# Patient Record
Sex: MALE | Race: BLACK OR AFRICAN AMERICAN | Employment: FULL TIME | URBAN - METROPOLITAN AREA
[De-identification: names, ages, dates, MRNs, and addresses within clinical notes are randomized per-mention and may not be internally consistent; named-entity substitution may affect disease eponyms.]

---

## 2017-01-07 ENCOUNTER — HOSPITAL ENCOUNTER (EMERGENCY)
Age: 27
Discharge: HOME | End: 2017-01-07
Attending: EMERGENCY MEDICINE
Payer: SELF-PAY

## 2017-01-07 VITALS
HEIGHT: 69 IN | SYSTOLIC BLOOD PRESSURE: 162 MMHG | WEIGHT: 280 LBS | TEMPERATURE: 97.8 F | RESPIRATION RATE: 16 BRPM | BODY MASS INDEX: 41.47 KG/M2 | OXYGEN SATURATION: 99 % | DIASTOLIC BLOOD PRESSURE: 94 MMHG | HEART RATE: 70 BPM
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
## 2017-01-07 DIAGNOSIS — T14.8XXA TRAUMATIC MYALGIA: ICD-10-CM

## 2017-01-07 PROCEDURE — 74011250637 HC RX REV CODE- 250/637: Performed by: EMERGENCY MEDICINE

## 2017-01-07 PROCEDURE — 99284 EMERGENCY DEPT VISIT MOD MDM: CPT | Performed by: EMERGENCY MEDICINE

## 2017-01-07 RX ORDER — IBUPROFEN 800 MG/1
800 TABLET ORAL
Status: COMPLETED | OUTPATIENT
Start: 2017-01-07 | End: 2017-01-07

## 2017-01-07 RX ORDER — NAPROXEN SODIUM 550 MG/1
550 TABLET ORAL
Qty: 14 TAB | Refills: 0 | Status: SHIPPED | OUTPATIENT
Start: 2017-01-07 | End: 2017-04-24

## 2017-01-07 RX ORDER — OXYCODONE AND ACETAMINOPHEN 5; 325 MG/1; MG/1
1 TABLET ORAL
Status: COMPLETED | OUTPATIENT
Start: 2017-01-07 | End: 2017-01-07

## 2017-01-07 RX ORDER — CYCLOBENZAPRINE HCL 5 MG
5 TABLET ORAL
Qty: 15 TAB | Refills: 0 | Status: SHIPPED | OUTPATIENT
Start: 2017-01-07 | End: 2017-04-24

## 2017-01-07 RX ADMIN — IBUPROFEN 800 MG: 800 TABLET, FILM COATED ORAL at 19:19

## 2017-01-07 RX ADMIN — OXYCODONE HYDROCHLORIDE AND ACETAMINOPHEN 1 TABLET: 5; 325 TABLET ORAL at 19:19
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
## 2017-01-08 NOTE — ED NOTES
I have reviewed discharge instructions with the patient. The patient verbalized understanding. Pt ambulatory to car. Pt home with family.
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
## 2017-04-24 ENCOUNTER — APPOINTMENT (OUTPATIENT)
Dept: GENERAL RADIOLOGY | Age: 27
End: 2017-04-24
Attending: EMERGENCY MEDICINE
Payer: SELF-PAY

## 2017-04-24 ENCOUNTER — HOSPITAL ENCOUNTER (EMERGENCY)
Age: 27
Discharge: HOME | End: 2017-04-24
Attending: EMERGENCY MEDICINE
Payer: SELF-PAY

## 2017-04-24 VITALS
SYSTOLIC BLOOD PRESSURE: 142 MMHG | TEMPERATURE: 97.9 F | DIASTOLIC BLOOD PRESSURE: 79 MMHG | BODY MASS INDEX: 42.95 KG/M2 | RESPIRATION RATE: 18 BRPM | WEIGHT: 290 LBS | HEIGHT: 69 IN | HEART RATE: 79 BPM | OXYGEN SATURATION: 98 %

## 2017-04-24 DIAGNOSIS — M25.561 ACUTE PAIN OF RIGHT KNEE: Primary | ICD-10-CM

## 2017-04-24 PROCEDURE — 99283 EMERGENCY DEPT VISIT LOW MDM: CPT | Performed by: EMERGENCY MEDICINE

## 2017-04-24 PROCEDURE — 74011250637 HC RX REV CODE- 250/637: Performed by: EMERGENCY MEDICINE

## 2017-04-24 PROCEDURE — 73562 X-RAY EXAM OF KNEE 3: CPT

## 2017-04-24 RX ORDER — NAPROXEN SODIUM 550 MG/1
550 TABLET ORAL
Qty: 12 TAB | Refills: 0 | Status: SHIPPED | OUTPATIENT
Start: 2017-04-24 | End: 2017-10-30

## 2017-04-24 RX ORDER — HYDROCODONE BITARTRATE AND ACETAMINOPHEN 5; 325 MG/1; MG/1
1 TABLET ORAL ONCE
Status: COMPLETED | OUTPATIENT
Start: 2017-04-24 | End: 2017-04-24

## 2017-04-24 RX ORDER — HYDROCODONE BITARTRATE AND ACETAMINOPHEN 5; 325 MG/1; MG/1
1 TABLET ORAL
Qty: 9 TAB | Refills: 0 | Status: SHIPPED | OUTPATIENT
Start: 2017-04-24 | End: 2017-10-30

## 2017-04-24 RX ORDER — IBUPROFEN 800 MG/1
800 TABLET ORAL
Status: COMPLETED | OUTPATIENT
Start: 2017-04-24 | End: 2017-04-24

## 2017-04-24 RX ADMIN — IBUPROFEN 800 MG: 800 TABLET ORAL at 22:44

## 2017-04-24 RX ADMIN — HYDROCODONE BITARTRATE AND ACETAMINOPHEN 1 TABLET: 5; 325 TABLET ORAL at 22:43

## 2017-04-24 NOTE — LETTER
[facility] EMERGENCY DEPT 
[address] [place] [address] [id number] [phone] Work/School Note Date: 4/24/2017 To Whom It May concern: Luis Felipe Lemos was seen and treated today in the emergency room by the following provider(s): 
Attending Provider: Dinorah Munoz DO. Luis Felipe Lemos may return to work on 4-27-17. Sincerely, Lalo Zuniga RN

## 2017-04-25 NOTE — ED NOTES
I have reviewed discharge instructions with the patient. The patient verbalized understanding. Pt ambulated to lobby unassisted and is accompanied by his family.

## 2017-04-25 NOTE — DISCHARGE INSTRUCTIONS
Knee Pain or Injury: Care Instructions  Your Care Instructions    Injuries are a common cause of knee problems. Sudden (acute) injuries may be caused by a direct blow to the knee. They can also be caused by abnormal twisting, bending, or falling on the knee. Pain, bruising, or swelling may be severe, and may start within minutes of the injury. Overuse is another cause of knee pain. Other causes are climbing stairs, kneeling, and other activities that use the knee. Everyday wear and tear, especially as you get older, also can cause knee pain. Rest, along with home treatment, often relieves pain and allows your knee to heal. If you have a serious knee injury, you may need tests and treatment. Follow-up care is a key part of your treatment and safety. Be sure to make and go to all appointments, and call your doctor if you are having problems. It's also a good idea to know your test results and keep a list of the medicines you take. How can you care for yourself at home? · Be safe with medicines. Read and follow all instructions on the label. ¨ If the doctor gave you a prescription medicine for pain, take it as prescribed. ¨ If you are not taking a prescription pain medicine, ask your doctor if you can take an over-the-counter medicine. · Rest and protect your knee. Take a break from any activity that may cause pain. · Put ice or a cold pack on your knee for 10 to 20 minutes at a time. Put a thin cloth between the ice and your skin. · Prop up a sore knee on a pillow when you ice it or anytime you sit or lie down for the next 3 days. Try to keep it above the level of your heart. This will help reduce swelling. · If your knee is not swollen, you can put moist heat, a heating pad, or a warm cloth on your knee. · If your doctor recommends an elastic bandage, sleeve, or other type of support for your knee, wear it as directed.   · Follow your doctor's instructions about how much weight you can put on your leg. Use a cane, crutches, or a walker as instructed. · Follow your doctor's instructions about activity during your healing process. If you can do mild exercise, slowly increase your activity. · Reach and stay at a healthy weight. Extra weight can strain the joints, especially the knees and hips, and make the pain worse. Losing even a few pounds may help. When should you call for help? Call 911 anytime you think you may need emergency care. For example, call if:  · You have symptoms of a blood clot in your lung (called a pulmonary embolism). These may include:  ¨ Sudden chest pain. ¨ Trouble breathing. ¨ Coughing up blood. Call your doctor now or seek immediate medical care if:  · You have severe or increasing pain. · Your leg or foot turns cold or changes color. · You cannot stand or put weight on your knee. · Your knee looks twisted or bent out of shape. · You cannot move your knee. · You have signs of infection, such as:  ¨ Increased pain, swelling, warmth, or redness. ¨ Red streaks leading from the knee. ¨ Pus draining from a place on your knee. ¨ A fever. · You have signs of a blood clot in your leg (called a deep vein thrombosis), such as:  ¨ Pain in your calf, back of the knee, thigh, or groin. ¨ Redness and swelling in your leg or groin. Watch closely for changes in your health, and be sure to contact your doctor if:  · You have tingling, weakness, or numbness in your knee. · You have any new symptoms, such as swelling. · You have bruises from a knee injury that last longer than 2 weeks. · You do not get better as expected. Where can you learn more? Go to http://jana-chelo.info/. Enter K195 in the search box to learn more about \"Knee Pain or Injury: Care Instructions. \"  Current as of: May 27, 2016  Content Version: 11.2  © 3584-7814 [employer].  Care instructions adapted under license by [employer] (which disclaims liability or warranty for this information). If you have questions about a medical condition or this instruction, always ask your healthcare professional. [place] [address] any warranty or liability for your use of this information.
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
## 2017-10-30 ENCOUNTER — HOSPITAL ENCOUNTER (EMERGENCY)
Age: 27
Discharge: HOME | End: 2017-10-30
Attending: EMERGENCY MEDICINE
Payer: SELF-PAY

## 2017-10-30 VITALS
DIASTOLIC BLOOD PRESSURE: 90 MMHG | SYSTOLIC BLOOD PRESSURE: 146 MMHG | WEIGHT: 280 LBS | HEIGHT: 72 IN | OXYGEN SATURATION: 98 % | BODY MASS INDEX: 37.93 KG/M2 | TEMPERATURE: 98.2 F | RESPIRATION RATE: 16 BRPM | HEART RATE: 64 BPM

## 2017-10-30 DIAGNOSIS — J21.9 ACUTE BRONCHIOLITIS DUE TO UNSPECIFIED ORGANISM: Primary | ICD-10-CM

## 2017-10-30 PROCEDURE — 99283 EMERGENCY DEPT VISIT LOW MDM: CPT | Performed by: EMERGENCY MEDICINE

## 2017-10-30 RX ORDER — FLUTICASONE PROPIONATE 50 MCG
2 SPRAY, SUSPENSION (ML) NASAL DAILY
Qty: 1 BOTTLE | Refills: 0 | Status: SHIPPED | OUTPATIENT
Start: 2017-10-30 | End: 2017-11-09

## 2017-10-30 RX ORDER — AZITHROMYCIN 500 MG/1
500 TABLET, FILM COATED ORAL DAILY
Qty: 3 TAB | Refills: 0 | Status: SHIPPED | OUTPATIENT
Start: 2017-10-30 | End: 2017-11-09

## 2017-10-30 RX ORDER — BENZONATATE 100 MG/1
100 CAPSULE ORAL
Qty: 30 CAP | Refills: 0 | Status: SHIPPED | OUTPATIENT
Start: 2017-10-30 | End: 2017-11-06

## 2017-10-30 NOTE — DISCHARGE INSTRUCTIONS
Bronchitis: Care Instructions  Your Care Instructions    Bronchitis is inflammation of the bronchial tubes, which carry air to the lungs. The tubes swell and produce mucus, or phlegm. The mucus and inflamed bronchial tubes make you cough. You may have trouble breathing. Most cases of bronchitis are caused by viruses like those that cause colds. Antibiotics usually do not help and they may be harmful. Bronchitis usually develops rapidly and lasts about 2 to 3 weeks in otherwise healthy people. Follow-up care is a key part of your treatment and safety. Be sure to make and go to all appointments, and call your doctor if you are having problems. It's also a good idea to know your test results and keep a list of the medicines you take. How can you care for yourself at home? · Take all medicines exactly as prescribed. Call your doctor if you think you are having a problem with your medicine. · Get some extra rest.  · Take an over-the-counter pain medicine, such as acetaminophen (Tylenol), ibuprofen (Advil, Motrin), or naproxen (Aleve) to reduce fever and relieve body aches. Read and follow all instructions on the label. · Do not take two or more pain medicines at the same time unless the doctor told you to. Many pain medicines have acetaminophen, which is Tylenol. Too much acetaminophen (Tylenol) can be harmful. · Take an over-the-counter cough medicine that contains dextromethorphan to help quiet a dry, hacking cough so that you can sleep. Avoid cough medicines that have more than one active ingredient. Read and follow all instructions on the label. · Breathe moist air from a humidifier, hot shower, or sink filled with hot water. The heat and moisture will thin mucus so you can cough it out. · Do not smoke. Smoking can make bronchitis worse. If you need help quitting, talk to your doctor about stop-smoking programs and medicines. These can increase your chances of quitting for good.   When should you call for help? Call 911 anytime you think you may need emergency care. For example, call if:  ? · You have severe trouble breathing. ?Call your doctor now or seek immediate medical care if:  ? · You have new or worse trouble breathing. ? · You cough up dark brown or bloody mucus (sputum). ? · You have a new or higher fever. ? · You have a new rash. ? Watch closely for changes in your health, and be sure to contact your doctor if:  ? · You cough more deeply or more often, especially if you notice more mucus or a change in the color of your mucus. ? · You are not getting better as expected. Where can you learn more? Go to http://jana-chelo.info/. Enter H333 in the search box to learn more about \"Bronchitis: Care Instructions. \"  Current as of: May 12, 2017  Content Version: 11.4  © 4775-5863 [employer]. Care instructions adapted under license by [employer] (which disclaims liability or warranty for this information). If you have questions about a medical condition or this instruction, always ask your healthcare professional. [address] any warranty or liability for your use of this information.

## 2017-10-30 NOTE — LETTER
[facility] EMERGENCY DEPT 
[address] [facility] [place] [id number] 
[phone] Work/School Note Date: 10/30/2017 To Whom It May concern: Christina Mcfarland was seen and treated today in the emergency room by the following provider(s): 
Attending Provider: Corrina Arevalo MD 
Nurse Practitioner: MOHIT Hanna. Christina Mcfarland was see in the Emergency Department 10/30/2017.  
 
Sincerely, 
 
 
 
 
MOHIT Hanna

## 2017-10-30 NOTE — ED TRIAGE NOTES
Pt arrives via personal vehicle for complaint of cough going on for a month. Pt denies coughing anything up. Pt is not a smoker, pt denies fever, aches or chills.  Pt's only complaint is a dry a cough

## 2017-10-30 NOTE — ED PROVIDER NOTES
HPI Comments: Patient presents with cough for the past month. He denies shortness of breath, fever, and headache. Patient is a 24 y.o. male presenting with cough. The history is provided by the patient. Cough   This is a new problem. The current episode started more than 1 week ago. The problem occurs constantly. The problem has not changed since onset. The cough is non-productive. There has been no fever. Pertinent negatives include no chest pain, no chills, no sweats, no eye redness, no ear congestion, no ear pain, no headaches, no rhinorrhea, no sore throat, no myalgias, no shortness of breath, no nausea, no vomiting and no confusion. He has tried nothing for the symptoms. The treatment provided no relief. He is not a smoker. History reviewed. No pertinent past medical history. History reviewed. No pertinent surgical history. History reviewed. No pertinent family history. Social History     Social History    Marital status: SINGLE     Spouse name: N/A    Number of children: N/A    Years of education: N/A     Occupational History    Not on file. Social History Main Topics    Smoking status: Never Smoker    Smokeless tobacco: Not on file    Alcohol use No    Drug use: No    Sexual activity: Not on file     Other Topics Concern    Not on file     Social History Narrative         ALLERGIES: Review of patient's allergies indicates no known allergies. Review of Systems   Constitutional: Negative for chills and fever. HENT: Negative for congestion, ear pain, rhinorrhea and sore throat. Eyes: Negative for redness. Respiratory: Positive for cough. Negative for shortness of breath. Cardiovascular: Negative for chest pain. Gastrointestinal: Negative for abdominal pain, blood in stool, constipation, nausea and vomiting. Musculoskeletal: Negative for arthralgias and myalgias. Neurological: Negative for dizziness, syncope, weakness and headaches.    Psychiatric/Behavioral: Negative for confusion. Vitals:    10/30/17 1112   BP: (!) 148/94   Pulse: 60   Resp: 20   Temp: 98.1 °F (36.7 °C)   SpO2: 98%   Weight: 127 kg (280 lb)   Height: 6' (1.829 m)            Physical Exam   Constitutional: He is oriented to person, place, and time. He appears well-developed and well-nourished. No distress. HENT:   Right Ear: Tympanic membrane is erythematous. Tympanic membrane is not injected. A middle ear effusion is present. Left Ear: Tympanic membrane is erythematous. Tympanic membrane is not injected. A middle ear effusion is present. Nose: Mucosal edema present. Right sinus exhibits no maxillary sinus tenderness and no frontal sinus tenderness. Left sinus exhibits no maxillary sinus tenderness and no frontal sinus tenderness. Mouth/Throat: Uvula is midline and mucous membranes are normal. Posterior oropharyngeal erythema present. Cardiovascular: Normal rate and regular rhythm. No murmur heard. Pulmonary/Chest: Effort normal and breath sounds normal. No respiratory distress. He has no wheezes. Abdominal: Soft. Bowel sounds are normal. He exhibits no distension. There is no tenderness. Musculoskeletal: Normal range of motion. Neurological: He is alert and oriented to person, place, and time. Skin: Skin is warm and dry. He is not diaphoretic. Psychiatric: He has a normal mood and affect. His behavior is normal.   Nursing note and vitals reviewed. MDM  Number of Diagnoses or Management Options  Acute bronchiolitis due to unspecified organism:   Diagnosis management comments: Patient given prescriptions for tessalon, azithromycin, zyrtec and flonase.      Patient Progress  Patient progress: stable    ED Course       Procedures

## 2017-10-30 NOTE — ED NOTES
I have reviewed discharge instructions with the patient. The patient verbalized understanding. Patient left ED via Discharge Method: ambulatory to Home with (insert name of family/friend, self, transport via self). Opportunity for questions and clarification provided. Patient given 4 scripts.

## 2017-11-09 ENCOUNTER — HOSPITAL ENCOUNTER (EMERGENCY)
Age: 27
Discharge: HOME | End: 2017-11-09
Attending: EMERGENCY MEDICINE
Payer: SELF-PAY

## 2017-11-09 VITALS
BODY MASS INDEX: 37.93 KG/M2 | WEIGHT: 280 LBS | TEMPERATURE: 98.4 F | RESPIRATION RATE: 17 BRPM | HEIGHT: 72 IN | SYSTOLIC BLOOD PRESSURE: 175 MMHG | DIASTOLIC BLOOD PRESSURE: 74 MMHG | HEART RATE: 74 BPM | OXYGEN SATURATION: 97 %

## 2017-11-09 DIAGNOSIS — M67.431 GANGLION CYST OF WRIST, RIGHT: Primary | ICD-10-CM

## 2017-11-09 PROCEDURE — 99282 EMERGENCY DEPT VISIT SF MDM: CPT | Performed by: EMERGENCY MEDICINE

## 2017-11-09 RX ORDER — NAPROXEN SODIUM 550 MG/1
550 TABLET ORAL
Qty: 20 TAB | Refills: 0 | Status: SHIPPED | OUTPATIENT
Start: 2017-11-09 | End: 2018-08-08

## 2017-11-09 NOTE — ED PROVIDER NOTES
HPI Comments: Patient presents to the ER complaining of a \"cyst\" on his right wrist.  States this has been there for a couple of months. Does report some intermittent pain at times. Denies any fevers. Reports he did hit his right wrist on something yesterday and had some increase of pain. Denies any other trauma    Patient is a 24 y.o. male presenting with cyst. The history is provided by the patient. Cyst    This is a chronic problem. The current episode started more than 1 week ago. The problem has not changed since onset. The rash is present on the right wrist. The pain is at a severity of 1/10. The patient is experiencing no pain. Associated symptoms include pain. Pertinent negatives include no itching. History reviewed. No pertinent past medical history. History reviewed. No pertinent surgical history. History reviewed. No pertinent family history. Social History     Social History    Marital status: SINGLE     Spouse name: N/A    Number of children: N/A    Years of education: N/A     Occupational History    Not on file. Social History Main Topics    Smoking status: Never Smoker    Smokeless tobacco: Never Used    Alcohol use No    Drug use: No    Sexual activity: Not on file     Other Topics Concern    Not on file     Social History Narrative         ALLERGIES: Review of patient's allergies indicates no known allergies. Review of Systems   Constitutional: Negative for fever and unexpected weight change. HENT: Negative for congestion and dental problem. Eyes: Negative for photophobia, redness and visual disturbance. Respiratory: Negative for choking, shortness of breath and stridor. Cardiovascular: Negative for palpitations and leg swelling. Gastrointestinal: Negative for abdominal pain and anal bleeding. Endocrine: Negative for polydipsia and polyphagia. Genitourinary: Negative for decreased urine volume, flank pain, hematuria and urgency. Musculoskeletal: Negative for back pain and gait problem. Skin: Negative for itching. Allergic/Immunologic: Negative for food allergies and immunocompromised state. Neurological: Negative for light-headedness and numbness. Hematological: Negative for adenopathy. Does not bruise/bleed easily. Psychiatric/Behavioral: Negative for behavioral problems. All other systems reviewed and are negative. Vitals:    11/09/17 1323   Pulse: 74   Resp: 17   Temp: 98.4 °F (36.9 °C)   SpO2: 97%   Weight: 127 kg (280 lb)   Height: 6' (1.829 m)            Physical Exam   Constitutional: He is oriented to person, place, and time. He appears well-developed and well-nourished. Cardiovascular: Normal rate and regular rhythm. Pulmonary/Chest: Effort normal and breath sounds normal.   Musculoskeletal:        Left wrist: He exhibits no tenderness and no bony tenderness. Arms:  Neurological: He is alert and oriented to person, place, and time. He has normal reflexes. Nursing note and vitals reviewed.        MDM  Number of Diagnoses or Management Options  Diagnosis management comments: Exam is consistent with a ganglion cyst of right wrist  No signs of infection  Discuss further supportive care    Risk of Complications, Morbidity, and/or Mortality  Presenting problems: low  Diagnostic procedures: low  Management options: low      ED Course       Procedures

## 2017-11-09 NOTE — LETTER
[address] [facility] EMERGENCY DEPT 
[facility] [address] [place] [id number] 
[phone] Work/School Note Date: 11/9/2017 To Whom It May concern: Alexandra Garduno was seen and treated today in the emergency room by the following provider(s): 
Attending Provider: Maria Luz Engel MD. Alexandra Garduno may return to work on 11/10/17. Sincerely, Karine Hernandez RN

## 2017-11-09 NOTE — ED TRIAGE NOTES
Pt in c/o right wrist pain \"for a while\". Cyst noted to right wrist. Denies pain. States it was painful yesterday after he hit it on something.

## 2017-11-09 NOTE — DISCHARGE INSTRUCTIONS
Ganglions: Care Instructions  Your Care Instructions    A ganglion is a small sac, or cyst, filled with a clear fluid that is like jelly. A ganglion may look like a bump on the hand or wrist. It also can appear on your feet, ankles, knees, or shoulders. It is not cancer. A ganglion can grow out of the protective area, or capsule, around a joint. It also can grow on a tendon sheath, which covers the ropelike tendons that connect muscle to bone. A ganglion may hurt or cause numbness if it presses on a nerve. Many ganglions do not need treatment, and they often go away on their own. But if a ganglion hurts, becomes larger, causes numbness, or limits your activity, your doctor may want to drain it with a needle and syringe or remove it with minor surgery. Follow-up care is a key part of your treatment and safety. Be sure to make and go to all appointments, and call your doctor if you are having problems. It's also a good idea to know your test results and keep a list of the medicines you take. How can you care for yourself at home? · Wear a wrist or finger splint as directed by your doctor. It will keep your wrist or hand from moving and help reduce the fluid in the cyst. This may be all you need for the ganglion to shrink and go away. · Do not smash a ganglion with a book or other heavy object. You may break a bone or otherwise injure your wrist by trying this folk remedy, and the ganglion may return anyway. · Do not try to drain the fluid by poking the ganglion with a pin or any other sharp object. You could cause an infection. When should you call for help? Call your doctor now or seek immediate medical care if:  ? · You have signs of infection, such as:  ¨ Increased pain, swelling, warmth, or redness. ¨ Red streaks leading from the cyst.  ¨ Pus draining from the cyst.  ¨ A fever. ? Watch closely for changes in your health, and be sure to contact your doctor if:  ? · You have increasing pain. ? · Your ganglion is getting larger. ? · You still have pain or numbness from a ganglion. Where can you learn more? Go to http://jana-chelo.info/. Enter M328 in the search box to learn more about \"Ganglions: Care Instructions. \"  Current as of: March 21, 2017  Content Version: 11.4  © 5057-9412 [employer]. Care instructions adapted under license by [employer] (which disclaims liability or warranty for this information). If you have questions about a medical condition or this instruction, always ask your healthcare professional. [place] [address] any warranty or liability for your use of this information.

## 2017-11-23 ENCOUNTER — APPOINTMENT (OUTPATIENT)
Dept: GENERAL RADIOLOGY | Age: 27
End: 2017-11-23
Payer: SELF-PAY

## 2017-11-23 ENCOUNTER — HOSPITAL ENCOUNTER (EMERGENCY)
Age: 27
Discharge: HOME | End: 2017-11-23
Attending: EMERGENCY MEDICINE
Payer: SELF-PAY

## 2017-11-23 VITALS
DIASTOLIC BLOOD PRESSURE: 80 MMHG | BODY MASS INDEX: 37.93 KG/M2 | SYSTOLIC BLOOD PRESSURE: 134 MMHG | WEIGHT: 280 LBS | TEMPERATURE: 98.2 F | HEIGHT: 72 IN | HEART RATE: 80 BPM | RESPIRATION RATE: 16 BRPM | OXYGEN SATURATION: 99 %

## 2017-11-23 DIAGNOSIS — R11.2 NAUSEA AND VOMITING, INTRACTABILITY OF VOMITING NOT SPECIFIED, UNSPECIFIED VOMITING TYPE: Primary | ICD-10-CM

## 2017-11-23 LAB
ALBUMIN SERPL-MCNC: 3.6 G/DL (ref 3.5–5)
ALBUMIN/GLOB SERPL: 1 {RATIO} (ref 1.2–3.5)
ALP SERPL-CCNC: 90 U/L (ref 50–136)
ALT SERPL-CCNC: 67 U/L (ref 12–65)
ANION GAP SERPL CALC-SCNC: 8 MMOL/L (ref 7–16)
AST SERPL-CCNC: 41 U/L (ref 15–37)
BASOPHILS # BLD: 0 K/UL (ref 0–0.2)
BASOPHILS NFR BLD: 0 % (ref 0–2)
BILIRUB SERPL-MCNC: 0.9 MG/DL (ref 0.2–1.1)
BUN SERPL-MCNC: 13 MG/DL (ref 6–23)
CALCIUM SERPL-MCNC: 8.5 MG/DL (ref 8.3–10.4)
CHLORIDE SERPL-SCNC: 105 MMOL/L (ref 98–107)
CO2 SERPL-SCNC: 29 MMOL/L (ref 21–32)
CREAT SERPL-MCNC: 1.13 MG/DL (ref 0.8–1.5)
DIFFERENTIAL METHOD BLD: ABNORMAL
EOSINOPHIL # BLD: 0 K/UL (ref 0–0.8)
EOSINOPHIL NFR BLD: 0 % (ref 0.5–7.8)
ERYTHROCYTE [DISTWIDTH] IN BLOOD BY AUTOMATED COUNT: 17.3 % (ref 11.9–14.6)
GLOBULIN SER CALC-MCNC: 3.6 G/DL (ref 2.3–3.5)
GLUCOSE SERPL-MCNC: 113 MG/DL (ref 65–100)
HCT VFR BLD AUTO: 47.6 % (ref 41.1–50.3)
HGB BLD-MCNC: 15.9 G/DL (ref 13.6–17.2)
LIPASE SERPL-CCNC: 113 U/L (ref 73–393)
LYMPHOCYTES # BLD: 0.4 K/UL (ref 0.5–4.6)
LYMPHOCYTES NFR BLD: 5 % (ref 13–44)
MCH RBC QN AUTO: 27.3 PG (ref 26.1–32.9)
MCHC RBC AUTO-ENTMCNC: 33.4 G/DL (ref 31.4–35)
MCV RBC AUTO: 81.8 FL (ref 79.6–97.8)
MONOCYTES # BLD: 0.4 K/UL (ref 0.1–1.3)
MONOCYTES NFR BLD: 5 % (ref 4–12)
NEUTS SEG # BLD: 6.3 K/UL (ref 1.7–8.2)
NEUTS SEG NFR BLD: 90 % (ref 43–78)
PLATELET # BLD AUTO: 171 K/UL (ref 150–450)
PLATELET COMMENTS,PCOM: SLIGHT
PMV BLD AUTO: ABNORMAL FL (ref 10.8–14.1)
POTASSIUM SERPL-SCNC: 3.6 MMOL/L (ref 3.5–5.1)
PROT SERPL-MCNC: 7.2 G/DL (ref 6.3–8.2)
RBC # BLD AUTO: 5.82 M/UL (ref 4.23–5.67)
RBC MORPH BLD: ABNORMAL
SODIUM SERPL-SCNC: 142 MMOL/L (ref 136–145)
WBC # BLD AUTO: 7.1 K/UL (ref 4.3–11.1)
WBC MORPH BLD: ABNORMAL

## 2017-11-23 PROCEDURE — 80053 COMPREHEN METABOLIC PANEL: CPT | Performed by: PHYSICIAN ASSISTANT

## 2017-11-23 PROCEDURE — 85025 COMPLETE CBC W/AUTO DIFF WBC: CPT | Performed by: PHYSICIAN ASSISTANT

## 2017-11-23 PROCEDURE — 96361 HYDRATE IV INFUSION ADD-ON: CPT | Performed by: PHYSICIAN ASSISTANT

## 2017-11-23 PROCEDURE — 83690 ASSAY OF LIPASE: CPT | Performed by: PHYSICIAN ASSISTANT

## 2017-11-23 PROCEDURE — 74020 XR ABD (AP AND ERECT OR DECUB): CPT

## 2017-11-23 PROCEDURE — 81003 URINALYSIS AUTO W/O SCOPE: CPT | Performed by: PHYSICIAN ASSISTANT

## 2017-11-23 PROCEDURE — 74011250636 HC RX REV CODE- 250/636: Performed by: PHYSICIAN ASSISTANT

## 2017-11-23 PROCEDURE — 96374 THER/PROPH/DIAG INJ IV PUSH: CPT | Performed by: PHYSICIAN ASSISTANT

## 2017-11-23 PROCEDURE — 99284 EMERGENCY DEPT VISIT MOD MDM: CPT | Performed by: PHYSICIAN ASSISTANT

## 2017-11-23 RX ORDER — SODIUM CHLORIDE 0.9 % (FLUSH) 0.9 %
5-10 SYRINGE (ML) INJECTION EVERY 8 HOURS
Status: DISCONTINUED | OUTPATIENT
Start: 2017-11-23 | End: 2017-11-23 | Stop reason: HOSPADM

## 2017-11-23 RX ORDER — SODIUM CHLORIDE 0.9 % (FLUSH) 0.9 %
5-10 SYRINGE (ML) INJECTION AS NEEDED
Status: DISCONTINUED | OUTPATIENT
Start: 2017-11-23 | End: 2017-11-23 | Stop reason: HOSPADM

## 2017-11-23 RX ORDER — ONDANSETRON HYDROCHLORIDE 8 MG/1
8 TABLET, FILM COATED ORAL
Qty: 10 TAB | Refills: 0 | Status: SHIPPED | OUTPATIENT
Start: 2017-11-23 | End: 2018-08-08

## 2017-11-23 RX ORDER — ONDANSETRON 2 MG/ML
4 INJECTION INTRAMUSCULAR; INTRAVENOUS
Status: COMPLETED | OUTPATIENT
Start: 2017-11-23 | End: 2017-11-23

## 2017-11-23 RX ADMIN — ONDANSETRON 4 MG: 2 INJECTION INTRAMUSCULAR; INTRAVENOUS at 14:56

## 2017-11-23 RX ADMIN — SODIUM CHLORIDE 1000 ML: 900 INJECTION, SOLUTION INTRAVENOUS at 14:56

## 2017-11-23 NOTE — ED TRIAGE NOTES
Patient complaining of nausea with vomiting and diarrhea that woke him up around 0400 this morning. Patient complaining of body aches and weakness.

## 2017-11-23 NOTE — LETTER
[facility] EMERGENCY DEPT 
[address] [place] [id number] [phone] Work/School Note Date: 11/23/2017 To Whom It May concern: Roslyn Jessica was seen and treated today in the emergency room by the following provider(s): 
Attending Provider: Gray Tubbs MD 
Physician Assistant: CRIS Davis. Roslyn Jessica may return to work on 11-25-17. Sincerely, CRIS Davis

## 2017-11-23 NOTE — ED NOTES
I have reviewed discharge instructions with the patient. The patient verbalized understanding. Patient left ED via Discharge Method: ambulatory to Home with family. Opportunity for questions and clarification provided. Patient given 1 scripts. To continue your aftercare when you leave the hospital, you may receive an automated call from our care team to check in on how you are doing. This is a free service and part of our promise to provide the best care and service to meet your aftercare needs.  If you have questions, or wish to unsubscribe from this service please call [phone]. Thank you for Choosing our [facility] Emergency Department.

## 2017-11-23 NOTE — DISCHARGE INSTRUCTIONS
Nausea and Vomiting: Care Instructions  Your Care Instructions    When you are nauseated, you may feel weak and sweaty and notice a lot of saliva in your mouth. Nausea often leads to vomiting. Most of the time you do not need to worry about nausea and vomiting, but they can be signs of other illnesses. Two common causes of nausea and vomiting are stomach flu and food poisoning. Nausea and vomiting from viral stomach flu will usually start to improve within 24 hours. Nausea and vomiting from food poisoning may last from 12 to 48 hours. The doctor has checked you carefully, but problems can develop later. If you notice any problems or new symptoms, get medical treatment right away. Follow-up care is a key part of your treatment and safety. Be sure to make and go to all appointments, and call your doctor if you are having problems. It's also a good idea to know your test results and keep a list of the medicines you take. How can you care for yourself at home? · To prevent dehydration, drink plenty of fluids, enough so that your urine is light yellow or clear like water. Choose water and other caffeine-free clear liquids until you feel better. If you have kidney, heart, or liver disease and have to limit fluids, talk with your doctor before you increase the amount of fluids you drink. · Rest in bed until you feel better. · When you are able to eat, try clear soups, mild foods, and liquids until all symptoms are gone for 12 to 48 hours. Other good choices include dry toast, crackers, cooked cereal, and gelatin dessert, such as Jell-O. When should you call for help? Call 911 anytime you think you may need emergency care. For example, call if:  ? · You passed out (lost consciousness). ?Call your doctor now or seek immediate medical care if:  ? · You have symptoms of dehydration, such as:  ¨ Dry eyes and a dry mouth. ¨ Passing only a little dark urine.   ¨ Feeling thirstier than usual.   ? · You have new or worsening belly pain. ? · You have a new or higher fever. ? · You vomit blood or what looks like coffee grounds. ? Watch closely for changes in your health, and be sure to contact your doctor if:  ? · You have ongoing nausea and vomiting. ? · Your vomiting is getting worse. ? · Your vomiting lasts longer than 2 days. ? · You are not getting better as expected. Where can you learn more? Go to http://jana-chelo.info/. Enter 25 536557 in the search box to learn more about \"Nausea and Vomiting: Care Instructions. \"  Current as of: March 20, 2017  Content Version: 11.4  © 7829-6994 Where. Care instructions adapted under license by [employer] (which disclaims liability or warranty for this information). If you have questions about a medical condition or this instruction, always ask your healthcare professional. [address] any warranty or liability for your use of this information.

## 2018-08-08 ENCOUNTER — HOSPITAL ENCOUNTER (EMERGENCY)
Age: 28
Discharge: HOME | End: 2018-08-08
Attending: STUDENT IN AN ORGANIZED HEALTH CARE EDUCATION/TRAINING PROGRAM
Payer: COMMERCIAL

## 2018-08-08 ENCOUNTER — APPOINTMENT (OUTPATIENT)
Dept: GENERAL RADIOLOGY | Age: 28
End: 2018-08-08
Attending: EMERGENCY MEDICINE
Payer: COMMERCIAL

## 2018-08-08 VITALS
HEART RATE: 65 BPM | TEMPERATURE: 98.4 F | HEIGHT: 69 IN | SYSTOLIC BLOOD PRESSURE: 149 MMHG | RESPIRATION RATE: 16 BRPM | WEIGHT: 282 LBS | DIASTOLIC BLOOD PRESSURE: 87 MMHG | BODY MASS INDEX: 41.77 KG/M2 | OXYGEN SATURATION: 98 %

## 2018-08-08 DIAGNOSIS — M67.432 GANGLION CYST OF DORSUM OF LEFT WRIST: Primary | ICD-10-CM

## 2018-08-08 PROCEDURE — 99283 EMERGENCY DEPT VISIT LOW MDM: CPT | Performed by: STUDENT IN AN ORGANIZED HEALTH CARE EDUCATION/TRAINING PROGRAM

## 2018-08-08 PROCEDURE — 73110 X-RAY EXAM OF WRIST: CPT

## 2018-08-08 PROCEDURE — 74011250637 HC RX REV CODE- 250/637: Performed by: STUDENT IN AN ORGANIZED HEALTH CARE EDUCATION/TRAINING PROGRAM

## 2018-08-08 RX ORDER — TRAMADOL HYDROCHLORIDE 50 MG/1
50 TABLET ORAL
Status: COMPLETED | OUTPATIENT
Start: 2018-08-08 | End: 2018-08-08

## 2018-08-08 RX ORDER — IBUPROFEN 800 MG/1
800 TABLET ORAL
Qty: 20 TAB | Refills: 0 | Status: SHIPPED | OUTPATIENT
Start: 2018-08-08 | End: 2018-08-15

## 2018-08-08 RX ADMIN — TRAMADOL HYDROCHLORIDE 50 MG: 50 TABLET, FILM COATED ORAL at 23:49

## 2018-08-08 NOTE — LETTER
[facility] EMERGENCY DEPT 
[address] [facility] [id number] 
[phone] Work/School Note Date: 8/8/2018 To Whom It May concern: Hany Kirby was seen and treated today in the emergency room by the following provider(s): 
Attending Provider: Kanika Leach DO. Hany Kirby may return to work on [employer]. Sincerely, Lindsay Gore RN

## 2018-08-09 NOTE — DISCHARGE INSTRUCTIONS
Ganglions: Care Instructions  Your Care Instructions    A ganglion is a small sac, or cyst, filled with a clear fluid that is like jelly. A ganglion may look like a bump on the hand or wrist. It also can appear on your feet, ankles, knees, or shoulders. It is not cancer. A ganglion can grow out of the protective area, or capsule, around a joint. It also can grow on a tendon sheath, which covers the ropelike tendons that connect muscle to bone. A ganglion may hurt or cause numbness if it presses on a nerve. Many ganglions do not need treatment, and they often go away on their own. But if a ganglion hurts, becomes larger, causes numbness, or limits your activity, your doctor may want to drain it with a needle and syringe or remove it with minor surgery. Follow-up care is a key part of your treatment and safety. Be sure to make and go to all appointments, and call your doctor if you are having problems. It's also a good idea to know your test results and keep a list of the medicines you take. How can you care for yourself at home? · Wear a wrist or finger splint as directed by your doctor. It will keep your wrist or hand from moving and help reduce the fluid in the cyst. This may be all you need for the ganglion to shrink and go away. · Do not smash a ganglion with a book or other heavy object. You may break a bone or otherwise injure your wrist by trying this folk remedy, and the ganglion may return anyway. · Do not try to drain the fluid by poking the ganglion with a pin or any other sharp object. You could cause an infection. When should you call for help? Call your doctor now or seek immediate medical care if:    · You have signs of infection, such as:  ¨ Increased pain, swelling, warmth, or redness.   ¨ Red streaks leading from the cyst.  ¨ Pus draining from the cyst.  ¨ A fever.    Watch closely for changes in your health, and be sure to contact your doctor if:    · You have increasing pain.     · Your ganglion is getting larger.     · You still have pain or numbness from a ganglion. Where can you learn more? Go to http://jana-chelo.info/. Enter J413 in the search box to learn more about \"Ganglions: Care Instructions. \"  Current as of: November 29, 2017  Content Version: 11.7  © 0478-0147 [employer]. Care instructions adapted under license by [employer] (which disclaims liability or warranty for this information). If you have questions about a medical condition or this instruction, always ask your healthcare professional. [place] [address] any warranty or liability for your use of this information.

## 2018-08-09 NOTE — ED TRIAGE NOTES
Bump on underside of right wrist, states \"it's been there a minute\" when asked for clarification pt states present over a year. It just started hurting today.

## 2018-08-09 NOTE — ED PROVIDER NOTES
HPI Comments: 68-year-old male patient presents with reports of right wrist pain secondary to an area of swelling on the lateral aspect of the right wrist.  Patient states his symptoms have been present for at least a year ago worsened over the past several weeks. He denies injury to the area. He states his pain and swelling seems to have increased with exertion at work. He lifts heavy objects at work on a regular basis. Patient denies previous evaluation for these symptoms. He denies any falls or trauma to the area. Denies previous similar symptoms. Patient is a 34 y.o. male presenting with wrist pain. The history is provided by the patient. Wrist Pain    This is a chronic problem. The current episode started more than 1 week ago. The problem occurs constantly. The problem has been gradually worsening. The pain is present in the right wrist. The quality of the pain is described as aching and sharp. The pain is moderate. Pertinent negatives include no numbness, full range of motion, no stiffness, no tingling, no itching, no back pain and no neck pain. The symptoms are aggravated by contact, movement and palpation. There has been no history of extremity trauma. History reviewed. No pertinent past medical history. History reviewed. No pertinent surgical history. History reviewed. No pertinent family history. Social History     Social History    Marital status: SINGLE     Spouse name: N/A    Number of children: N/A    Years of education: N/A     Occupational History    Not on file. Social History Main Topics    Smoking status: Never Smoker    Smokeless tobacco: Never Used    Alcohol use No    Drug use: No    Sexual activity: Not on file     Other Topics Concern    Not on file     Social History Narrative    ** Merged History Encounter **              ALLERGIES: Review of patient's allergies indicates no known allergies.     Review of Systems   Musculoskeletal: Negative for back pain, neck pain and stiffness. Skin: Negative for itching. Neurological: Negative for tingling and numbness. All other systems reviewed and are negative. Vitals:    08/08/18 2151 08/08/18 2351   BP: (!) 164/98 149/87   Pulse: 77 65   Resp: 17 16   Temp: 98.3 °F (36.8 °C) 98.4 °F (36.9 °C)   SpO2: 98% 98%   Weight: 127.9 kg (282 lb)    Height: 5' 9\" (1.753 m)             Physical Exam   Constitutional: He is oriented to person, place, and time. He appears well-developed and well-nourished. No distress. Alert and oriented to person, place and time. No acute distress. Speaks in clear, fluent sentences. HENT:   Head: Normocephalic and atraumatic. Nose: Nose normal.   Eyes: Conjunctivae and EOM are normal. Pupils are equal, round, and reactive to light. Neck: Normal range of motion. Neck supple. No JVD present. No tracheal deviation present. Cardiovascular: S2 normal and intact distal pulses. Pulmonary/Chest: Effort normal and breath sounds normal. No accessory muscle usage or stridor. No tachypnea and no bradypnea. No respiratory distress. He exhibits no tenderness. Abdominal: Normal appearance. Musculoskeletal: Normal range of motion. He exhibits no edema, tenderness or deformity. quarter size area of swelling over the lateral aspects of the patient's risk on the volar surface, right sided. Pulses are intact, brisk capillary refill present. No evidence of other abnormality on evaluation. Neurological: He is alert and oriented to person, place, and time. No cranial nerve deficit. Skin: Skin is warm and dry. No rash noted. He is not diaphoretic. Psychiatric: He has a normal mood and affect. His behavior is normal.   Nursing note and vitals reviewed.        MDM  Number of Diagnoses or Management Options  Ganglion cyst of dorsum of left wrist: new and requires workup  Diagnosis management comments: Extremities unremarkable, physical exam consistent with ganglion cyst.  Patient will be referred to orthopedic specialists for further evaluation and discussion of removal of the cyst.       Amount and/or Complexity of Data Reviewed  Tests in the radiology section of CPT®: ordered and reviewed    Risk of Complications, Morbidity, and/or Mortality  Presenting problems: moderate  Diagnostic procedures: low  Management options: moderate    Patient Progress  Patient progress: stable        ED Course       Procedures

## 2018-08-09 NOTE — ED NOTES
I have reviewed discharge instructions with the patient and parent. The patient and parent verbalized understanding. Patient left ED via Discharge Method: ambulatory to Home with Olya. Opportunity for questions and clarification provided. Patient given 1 scripts. To continue your aftercare when you leave the hospital, you may receive an automated call from our care team to check in on how you are doing. This is a free service and part of our promise to provide the best care and service to meet your aftercare needs.  If you have questions, or wish to unsubscribe from this service please call [phone]. Thank you for Choosing our [facility] Emergency Department.

## 2018-09-01 ENCOUNTER — HOSPITAL ENCOUNTER (EMERGENCY)
Age: 28
Discharge: HOME | End: 2018-09-01
Attending: EMERGENCY MEDICINE
Payer: COMMERCIAL

## 2018-09-01 VITALS
TEMPERATURE: 98.2 F | RESPIRATION RATE: 18 BRPM | SYSTOLIC BLOOD PRESSURE: 154 MMHG | HEART RATE: 58 BPM | OXYGEN SATURATION: 99 % | DIASTOLIC BLOOD PRESSURE: 92 MMHG

## 2018-09-01 DIAGNOSIS — K08.89 PAIN, DENTAL: Primary | ICD-10-CM

## 2018-09-01 PROCEDURE — 74011250637 HC RX REV CODE- 250/637: Performed by: EMERGENCY MEDICINE

## 2018-09-01 PROCEDURE — 99283 EMERGENCY DEPT VISIT LOW MDM: CPT | Performed by: EMERGENCY MEDICINE

## 2018-09-01 RX ORDER — IBUPROFEN 800 MG/1
800 TABLET ORAL
Qty: 21 TAB | Refills: 0 | Status: SHIPPED | OUTPATIENT
Start: 2018-09-01 | End: 2018-09-08

## 2018-09-01 RX ORDER — PENICILLIN V POTASSIUM 500 MG/1
500 TABLET, FILM COATED ORAL 4 TIMES DAILY
Qty: 28 TAB | Refills: 0 | Status: SHIPPED | OUTPATIENT
Start: 2018-09-01 | End: 2018-09-08

## 2018-09-01 RX ORDER — IBUPROFEN 800 MG/1
800 TABLET ORAL
Status: COMPLETED | OUTPATIENT
Start: 2018-09-01 | End: 2018-09-01

## 2018-09-01 RX ADMIN — IBUPROFEN 800 MG: 800 TABLET, FILM COATED ORAL at 21:55

## 2018-09-02 NOTE — ED PROVIDER NOTES
Baseline creatinine is 2-2 5 HPI Comments: 35 yo M presents w/ c/o left lower dental pain w/ mild swelling of gums surrounding tooth over the past several days. Denies fever, chills, nausea, vomiting, HA, sore throat, voice change, trouble swallowing, SOB. States he does not have a dentist. States he has not taken any pain medication prior to arrival. Denies facial swelling. Patient is a 34 y.o. male presenting with gum problem. The history is provided by the patient. Gum Problem This is a new problem. The current episode started more than 2 days ago. The problem occurs constantly. The problem has not changed since onset. The pain is located in the left lower mouth. The quality of the pain is constant and dull. The pain is at a severity of 2/10. The pain is mild. There was no vomiting, no nausea, no fever, no chest pain, no shortness of breath, no headaches and no drainage. The patient has no cardiac history. History reviewed. No pertinent past medical history. History reviewed. No pertinent surgical history. History reviewed. No pertinent family history. Social History Social History  Marital status: SINGLE Spouse name: N/A  
 Number of children: N/A  
 Years of education: N/A Occupational History  Not on file. Social History Main Topics  Smoking status: Never Smoker  Smokeless tobacco: Never Used  Alcohol use No  
 Drug use: No  
 Sexual activity: Not on file Other Topics Concern  Not on file Social History Narrative ** Merged History Encounter ** ALLERGIES: Review of patient's allergies indicates no known allergies. Review of Systems Constitutional: Negative for chills and fever. HENT: Positive for dental problem. Negative for congestion, drooling, ear discharge, facial swelling, rhinorrhea, sore throat, trouble swallowing and voice change. Respiratory: Negative for cough and shortness of breath. Cardiovascular: Negative for chest pain. Gastrointestinal: Negative for abdominal pain, nausea and vomiting. Musculoskeletal: Negative for myalgias and neck pain. Skin: Negative for rash. Neurological: Negative for dizziness, speech difficulty, weakness, light-headedness and headaches. Vitals:  
 09/01/18 2055 BP: (!) 154/92 Pulse: (!) 58 Resp: 18 Temp: 98.2 °F (36.8 °C) SpO2: 99% Physical Exam  
Constitutional: He is oriented to person, place, and time. Patient well-appearing in no acute distress. HENT:  
Head: Normocephalic. Right Ear: External ear normal.  
Left Ear: External ear normal.  
Mouth/Throat: Uvula is midline. No oral lesions. No trismus in the jaw. Normal dentition. Dental caries present. No uvula swelling. No oropharyngeal exudate, posterior oropharyngeal edema, posterior oropharyngeal erythema or tonsillar abscesses. Evidence of several dental caries present to left lower mandible. Uvula midline. No trismus. Pt tolerating secretions. No stridor. No evidence of abscess or facial swelling. Mild gum tenderness around affected teeth. No evidence of ANUG. No evidence of dental abscess. No facial swelling noted. Eyes: Conjunctivae and EOM are normal. Pupils are equal, round, and reactive to light. Neck: Normal range of motion. No JVD present. No tracheal deviation present. Cardiovascular: Normal rate, regular rhythm and intact distal pulses. Pulmonary/Chest: Effort normal and breath sounds normal.  
CTAB. No stridor or wheezing. Abdominal: Soft. Soft, NTND. Neurological: He is alert and oriented to person, place, and time. No cranial nerve deficit. Coordination normal.  
Skin: Skin is warm and dry. No rash. Psychiatric: He has a normal mood and affect. Judgment normal.  
Nursing note and vitals reviewed. MDM Number of Diagnoses or Management Options Pain, dental: minor Diagnosis management comments: VSS.  Pt with dental disease and need for close outpatient follow-up with dentist. 
Will d/c home with Ibuprofen and Abx coverage. Given strict return precautions and instructed on need for evaluation by dentist ASAP. Pt tolerating po and is well appearing. Pt in agreement with plan. Amount and/or Complexity of Data Reviewed Review and summarize past medical records: yes Risk of Complications, Morbidity, and/or Mortality Presenting problems: low Diagnostic procedures: low Management options: low Patient Progress Patient progress: stable ED Course Procedures Ashok Mancilla MD; 9/3/2018 @2:06 AM Voice dictation software was used during the making of this note. This software is not perfect and grammatical and other typographical errors may be present.   This note has not been proofread for errors. 
===================================================================

## 2018-09-02 NOTE — DISCHARGE INSTRUCTIONS
Tooth and Gum Pain: Care Instructions  Your Care Instructions    The most common causes of dental pain are tooth decay and gum disease. Pain can also be caused by an infection of the tooth (abscess) or the gums. Or you may have pain from a broken or cracked tooth. Other causes of pain include infection and damage to a tooth from nervous grinding of your teeth. A wisdom tooth can be painful when it is coming in but cannot break through the gum. It can also be painful when the tooth is only partway in and extra gum tissue has formed around it. The tissue can get inflamed (pericoronitis), and sometimes it gets infected. Prompt dental care can help find the cause of your toothache and keep the tooth from dying or gum disease from getting worse. Self-care at home may reduce your pain and discomfort. Follow-up care is a key part of your treatment and safety. Be sure to make and go to all appointments, and call your dentist or doctor if you are having problems. It's also a good idea to know your test results and keep a list of the medicines you take. How can you care for yourself at home? · To reduce pain and facial swelling, put an ice or cold pack on the outside of your cheek for 10 to 20 minutes at a time. Put a thin cloth between the ice and your skin. Do not use heat. · If your doctor prescribed antibiotics, take them as directed. Do not stop taking them just because you feel better. You need to take the full course of antibiotics. · Ask your doctor if you can take an over-the-counter pain medicine, such as acetaminophen (Tylenol), ibuprofen (Advil, Motrin), or naproxen (Aleve). Be safe with medicines. Read and follow all instructions on the label. · Avoid very hot, cold, or sweet foods and drinks if they increase your pain. · Rinse your mouth with warm salt water every 2 hours to help relieve pain and swelling. Mix 1 teaspoon of salt in 8 ounces of water.   · Talk to your dentist about using special toothpaste for sensitive teeth. To reduce pain on contact with heat or cold or when brushing, brush with this toothpaste regularly or rub a small amount of the paste on the sensitive area with a clean finger 2 or 3 times a day. Floss gently between your teeth. · Do not smoke or use spit tobacco. Tobacco use can make gum problems worse, decreases your ability to fight infection in your gums, and delays healing. If you need help quitting, talk to your doctor about stop-smoking programs and medicines. These can increase your chances of quitting for good. When should you call for help? Call 911 anytime you think you may need emergency care. For example, call if:    · You have trouble breathing.    Call your dentist or doctor now or seek immediate medical care if:    · You have signs of infection, such as:  ¨ Increased pain, swelling, warmth, or redness. ¨ Red streaks leading from the area. ¨ Pus draining from the area. ¨ A fever.    Watch closely for changes in your health, and be sure to contact your doctor if:    · You do not get better as expected. Where can you learn more? Go to http://janaMetriclychelo.info/. Enter 0363 [id number] in the search box to learn more about \"Tooth and Gum Pain: Care Instructions. \"  Current as of: May 12, 2017  Content Version: 11.7  © 3638-2094 [employer]. Care instructions adapted under license by [employer] (which disclaims liability or warranty for this information). If you have questions about a medical condition or this instruction, always ask your healthcare professional. [place] [address] any warranty or liability for your use of this information. Periodontal Conditions: Care Instructions  Your Care Instructions    Periodontal conditions affect the gums, bone, and tissue that surround and support the teeth. The most common problems are caused by plaque.  Plaque is a thin film of bacteria that sticks to teeth above and below the gum line. It can build up and harden into tartar. The bacteria in plaque and tartar can cause gum disease. Gingivitis is a disease that affects the gums (gingiva). The gums are the soft tissue that surrounds the teeth. Gingivitis causes red, swollen, tender gums that bleed easily when brushed, persistent bad breath, and sensitive teeth. Because it is not painful, many people do not get treatment when they should. Gingivitis can be reversed with good dental care. Periodontitis is a more advanced disease that affects more than the gums. The gums pull away from the teeth. This leaves deep pockets where bacteria can grow. The disease can damage the bones that support the teeth. The teeth may get loose and fall out. A periodontal condition should be treated as soon as it is found. Finding gum problems early, treating them right away, and having regular checkups bring the best results. You can treat mild periodontal conditions by brushing and flossing your teeth every day. Your dentist may prescribe a mouthwash to kill the bacteria that can damage teeth and gums. Your dentist may have you take antibiotics to treat infection from moderate periodontal disease. If your gums have pulled away from your teeth, you may need cleaning between the teeth and gums right down to the teeth roots. This is called root planing and scaling. If you have severe periodontal disease, you may need surgery to remove diseased gum tissue or repair bone damage. Follow-up care is a key part of your treatment and safety. Be sure to make and go to all appointments, and call your dentist if you are having problems. It's also a good idea to know your test results and keep a list of the medicines you take. How can you care for yourself at home? · If your dentist prescribed antibiotics, take them as directed. Do not stop taking them just because you feel better. You need to take the full course of antibiotics.   · Brush your teeth twice a day, in the morning and at night. ¨ Use a toothbrush with soft, rounded-end bristles and a head that is small enough to reach all parts of your teeth and mouth. Replace your toothbrush every 3 to 4 months. ¨ Use a fluoride toothpaste. ¨ Place the brush at a 45-degree angle where the teeth meet the gums. Press firmly, and gently rock the brush back and forth using small circular movements. ¨ Brush chewing surfaces vigorously with short back-and-forth strokes. ¨ Brush your tongue from back to front. · Floss at least once a day. Choose the type and flavor that you like best.  · Have your teeth cleaned by a professional at least twice a year. · Ask your dentist about using an antibacterial mouthwash to help reduce bacteria. · Rinse your mouth with water or chew sugar-free gum after meals if you can't brush your teeth. · Do not smoke or use smokeless tobacco. Tobacco use can cause periodontal disease. When should you call for help? Call your dentist now or seek immediate medical care if:    · You have symptoms of infection, such as:  ¨ Increased pain, swelling, warmth, or redness. ¨ Red streaks leading from the area. ¨ Pus draining from the area. ¨ A fever.    Watch closely for changes in your health, and be sure to contact your dentist if:    · You have new or worse tooth pain.     · You do not get better as expected. Where can you learn more? Go to http://jana-chelo.info/. Enter W777 in the search box to learn more about \"Periodontal Conditions: Care Instructions. \"  Current as of: May 12, 2017  Content Version: 11.7  © 4750-4808 [employer]. Care instructions adapted under license by [employer] (which disclaims liability or warranty for this information).  If you have questions about a medical condition or this instruction, always ask your healthcare professional. [address] any warranty or liability for your use of this information.

## 2018-09-02 NOTE — ED NOTES
I have reviewed discharge instructions with the patient. The patient verbalized understanding. Patient left ED via Discharge Method: ambulatory to Home with (insert name of family/friend, self, transport  wife). Opportunity for questions and clarification provided. Patient given 2 scripts. To continue your aftercare when you leave the hospital, you may receive an automated call from our care team to check in on how you are doing. This is a free service and part of our promise to provide the best care and service to meet your aftercare needs.  If you have questions, or wish to unsubscribe from this service please call [phone]. Thank you for Choosing our [facility] Emergency Department.

## 2019-08-30 ENCOUNTER — HOSPITAL ENCOUNTER (EMERGENCY)
Age: 29
Discharge: HOME | End: 2019-08-30
Attending: STUDENT IN AN ORGANIZED HEALTH CARE EDUCATION/TRAINING PROGRAM
Payer: COMMERCIAL

## 2019-08-30 ENCOUNTER — APPOINTMENT (OUTPATIENT)
Dept: GENERAL RADIOLOGY | Age: 29
End: 2019-08-30
Attending: STUDENT IN AN ORGANIZED HEALTH CARE EDUCATION/TRAINING PROGRAM
Payer: COMMERCIAL

## 2019-08-30 VITALS
WEIGHT: 280 LBS | SYSTOLIC BLOOD PRESSURE: 145 MMHG | OXYGEN SATURATION: 98 % | BODY MASS INDEX: 41.47 KG/M2 | DIASTOLIC BLOOD PRESSURE: 96 MMHG | RESPIRATION RATE: 16 BRPM | HEIGHT: 69 IN | HEART RATE: 60 BPM | TEMPERATURE: 98.2 F
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
## 2019-08-30 PROCEDURE — 72070 X-RAY EXAM THORAC SPINE 2VWS: CPT

## 2019-08-30 PROCEDURE — 99283 EMERGENCY DEPT VISIT LOW MDM: CPT | Performed by: STUDENT IN AN ORGANIZED HEALTH CARE EDUCATION/TRAINING PROGRAM

## 2019-08-30 PROCEDURE — 93005 ELECTROCARDIOGRAM TRACING: CPT | Performed by: STUDENT IN AN ORGANIZED HEALTH CARE EDUCATION/TRAINING PROGRAM

## 2019-08-30 PROCEDURE — 71046 X-RAY EXAM CHEST 2 VIEWS: CPT

## 2019-08-30 RX ORDER — IBUPROFEN 800 MG/1
800 TABLET ORAL
Qty: 20 TAB | Refills: 0 | Status: SHIPPED | OUTPATIENT
Start: 2019-08-30 | End: 2019-09-06

## 2019-08-30 RX ORDER — CYCLOBENZAPRINE HCL 5 MG
10 TABLET ORAL
Qty: 20 TAB | Refills: 0 | Status: SHIPPED | OUTPATIENT
Start: 2019-08-30 | End: 2021-01-10

## 2019-08-31 LAB
ATRIAL RATE: 55 BPM
CALCULATED P AXIS, ECG09: 32 DEGREES
CALCULATED R AXIS, ECG10: 53 DEGREES
CALCULATED T AXIS, ECG11: 31 DEGREES
DIAGNOSIS, 93000: NORMAL
P-R INTERVAL, ECG05: 170 MS
Q-T INTERVAL, ECG07: 424 MS
QRS DURATION, ECG06: 96 MS
QTC CALCULATION (BEZET), ECG08: 405 MS
VENTRICULAR RATE, ECG03: 55 BPM
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
## 2019-08-31 NOTE — ED NOTES
I have reviewed discharge instructions with the patient. The patient verbalized understanding. Patient left ED via Discharge Method: ambulatory to Home with ( family, self). Opportunity for questions and clarification provided. Patient given 2 scripts. To continue your aftercare when you leave the hospital, you may receive an automated call from our care team to check in on how you are doing. This is a free service and part of our promise to provide the best care and service to meet your aftercare needs.  If you have questions, or wish to unsubscribe from this service please call [phone]. Thank you for Choosing our [address] [place] [address] Emergency Department.
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
## 2021-01-10 ENCOUNTER — HOSPITAL ENCOUNTER (EMERGENCY)
Age: 31
Discharge: HOME | End: 2021-01-10
Attending: EMERGENCY MEDICINE | Admitting: EMERGENCY MEDICINE
Payer: COMMERCIAL

## 2021-01-10 ENCOUNTER — APPOINTMENT (OUTPATIENT)
Dept: GENERAL RADIOLOGY | Age: 31
End: 2021-01-10
Attending: EMERGENCY MEDICINE
Payer: COMMERCIAL

## 2021-01-10 VITALS
RESPIRATION RATE: 17 BRPM | HEIGHT: 69 IN | SYSTOLIC BLOOD PRESSURE: 155 MMHG | TEMPERATURE: 98.8 F | HEART RATE: 78 BPM | OXYGEN SATURATION: 97 % | BODY MASS INDEX: 41.47 KG/M2 | WEIGHT: 280 LBS | DIASTOLIC BLOOD PRESSURE: 87 MMHG

## 2021-01-10 DIAGNOSIS — M25.561 ACUTE PAIN OF RIGHT KNEE: Primary | ICD-10-CM

## 2021-01-10 PROCEDURE — 99282 EMERGENCY DEPT VISIT SF MDM: CPT

## 2021-01-10 PROCEDURE — 73560 X-RAY EXAM OF KNEE 1 OR 2: CPT

## 2021-01-10 RX ORDER — DICLOFENAC SODIUM 50 MG/1
50 TABLET, DELAYED RELEASE ORAL 2 TIMES DAILY
Qty: 14 TAB | Refills: 0 | Status: SHIPPED | OUTPATIENT
Start: 2021-01-10 | End: 2021-01-17

## 2021-01-10 NOTE — Clinical Note
[id number] [address] EMERGENCY DEPT 
[address] [facility] [id number] [phone] Work/School Note Date: 1/10/2021 To Whom It May concern: Kiarra Ramírez was seen and treated today in the emergency room by the following provider(s): 
Attending Provider: Chris Strong MD. Kiarra Ramírez is excused from work/school on 1/10/2021 through 1/13/2021. He is medically clear to return to work/school on 1/14/2021. Sincerely, Srinivas Ace MD

## 2021-01-11 NOTE — DISCHARGE INSTRUCTIONS
Return with any redness, fevers, swelling, worsening symptoms, or additional concerns. Follow-up with an orthopedist not back to normal in 7 to 10 days.

## 2021-01-11 NOTE — ED NOTES
I have reviewed discharge instructions with the patient. The patient verbalized understanding. Patient left ED via Discharge Method: ambulatory to Home with  self). Opportunity for questions and clarification provided. Patient given 1 scripts. To continue your aftercare when you leave the hospital, you may receive an automated call from our care team to check in on how you are doing. This is a free service and part of our promise to provide the best care and service to meet your aftercare needs.  If you have questions, or wish to unsubscribe from this service please call [phone]. Thank you for Choosing our [facility] Emergency Department.

## 2021-01-11 NOTE — ED PROVIDER NOTES
77-year-old gentleman presents with concerns about 2 weeks of pain in his right knee. 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 He says with that he has had some knee pain and knee irritation. He denies any specific trauma or injury. He has had no redness or swelling. No fevers or chills. No other associated symptoms. Elements of this note were created using speech recognition software. As such, errors of speech recognition may be present. No past medical history on file. No past surgical history on file. No family history on file.     Social History     Socioeconomic History    Marital status: SINGLE     Spouse name: Not on file    Number of children: Not on file    Years of education: Not on file    Highest education level: Not on file   Occupational History    Not on file   Social Needs    Financial resource strain: Not on file    Food insecurity     Worry: Not on file     Inability: Not on file    Transportation needs     Medical: Not on file     Non-medical: Not on file   Tobacco Use    Smoking status: Never Smoker    Smokeless tobacco: Never Used   Substance and Sexual Activity    Alcohol use: No    Drug use: No    Sexual activity: Not on file   Lifestyle    Physical activity     Days per week: Not on file     Minutes per session: Not on file    Stress: Not on file   Relationships    Social connections     Talks on phone: Not on file     Gets together: Not on file     Attends Orthodoxy service: Not on file     Active member of club or organization: Not on file     Attends meetings of clubs or organizations: Not on file     Relationship status: Not on file    Intimate partner violence     Fear of current or ex partner: Not on file     Emotionally abused: Not on file     Physically abused: Not on file     Forced sexual activity: Not on file   Other Topics Concern    Not on file   Social History Narrative    ** Merged History Encounter ** ALLERGIES: Patient has no known allergies. Review of Systems   Constitutional: Negative for chills and fever. Respiratory: Negative for cough and shortness of breath. Gastrointestinal: Negative for nausea and vomiting. Musculoskeletal: Positive for arthralgias and gait problem. Negative for joint swelling. Skin: Negative for color change and wound. Vitals:    01/10/21 2254   BP: (!) 160/105   Pulse: 69   Resp: 17   Temp: 98.8 °F (37.1 °C)   SpO2: 97%   Weight: 127 kg (280 lb)   Height: 5' 9\" (1.753 m)            Physical Exam  Vitals signs and nursing note reviewed. Constitutional:       Appearance: Normal appearance. Musculoskeletal:      Comments: Patient has mild tenderness in the area of his patella. No edema or erythema. No ligamentous instabilities. Skin:     General: Skin is warm and dry. Neurological:      General: No focal deficit present. Mental Status: He is alert and oriented to person, place, and time. MDM  Number of Diagnoses or Management Options  Acute pain of right knee  Diagnosis management comments: X-ray shows a calcified tendinitis. He does not have a drainable effusion. I will discharge him home with some anti-inflammatories.          Procedures

## 2021-01-11 NOTE — ED NOTES
I have reviewed discharge instructions with the patient. The patient verbalized understanding. Patient left ED via Discharge Method: ambulatory to Home with self. Opportunity for questions and clarification provided. Patient given 0 scripts. To continue your aftercare when you leave the hospital, you may receive an automated call from our care team to check in on how you are doing. This is a free service and part of our promise to provide the best care and service to meet your aftercare needs.  If you have questions, or wish to unsubscribe from this service please call [phone]. Thank you for Choosing our [facility] Emergency Department.

## 2021-01-11 NOTE — ED TRIAGE NOTES
Pt c/o intermittent right knee pain for weeks, with worsening pain this week. Pt denies any injury or fall. Pt ambulatory to triage with a limp.  Pt masked upon arrival to the ED

## 2021-04-21 ENCOUNTER — HOSPITAL ENCOUNTER (EMERGENCY)
Age: 31
Discharge: HOME | End: 2021-04-21
Attending: EMERGENCY MEDICINE

## 2021-04-21 ENCOUNTER — APPOINTMENT (OUTPATIENT)
Dept: GENERAL RADIOLOGY | Age: 31
End: 2021-04-21
Attending: EMERGENCY MEDICINE

## 2021-04-21 VITALS
RESPIRATION RATE: 16 BRPM | HEIGHT: 69 IN | BODY MASS INDEX: 41.47 KG/M2 | DIASTOLIC BLOOD PRESSURE: 93 MMHG | OXYGEN SATURATION: 98 % | HEART RATE: 66 BPM | TEMPERATURE: 98.5 F | SYSTOLIC BLOOD PRESSURE: 152 MMHG | WEIGHT: 280 LBS
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
## 2021-04-21 DIAGNOSIS — T14.8XXA CONTUSION OF LEFT CLAVICLE, INITIAL ENCOUNTER: ICD-10-CM

## 2021-04-21 PROCEDURE — 74011250637 HC RX REV CODE- 250/637: Performed by: EMERGENCY MEDICINE

## 2021-04-21 PROCEDURE — 99283 EMERGENCY DEPT VISIT LOW MDM: CPT

## 2021-04-21 PROCEDURE — 73000 X-RAY EXAM OF COLLAR BONE: CPT

## 2021-04-21 RX ORDER — NAPROXEN 250 MG/1
500 TABLET ORAL ONCE
Status: COMPLETED | OUTPATIENT
Start: 2021-04-21 | End: 2021-04-21

## 2021-04-21 RX ORDER — NAPROXEN 500 MG/1
500 TABLET ORAL 2 TIMES DAILY WITH MEALS
Qty: 20 TAB | Refills: 0 | Status: SHIPPED | OUTPATIENT
Start: 2021-04-21

## 2021-04-21 RX ADMIN — NAPROXEN 500 MG: 250 TABLET ORAL at 23:27

## 2021-04-21 NOTE — Clinical Note
[id number] [address] EMERGENCY DEPT 
[address] [facility] 
[place] [id number] [phone] Work/School Note Date: 4/21/2021 To Whom It May concern: Domenica Noel was seen and treated today in the emergency room by the following provider(s): 
Attending Provider: Jessie Sequeira MD. Domenica Noel is excused from work/school on 04/21/21 and 04/22/21. He is medically clear to return to work/school on 4/23/2021. Sincerely, Armando Mcclellan MD

## 2021-04-22 NOTE — ED NOTES
I have reviewed discharge instructions with the patient. The patient verbalized understanding. Patient left ED via Discharge Method: ambulatory to Home with family. Opportunity for questions and clarification provided. Patient given 1 scripts. To continue your aftercare when you leave the hospital, you may receive an automated call from our care team to check in on how you are doing. This is a free service and part of our promise to provide the best care and service to meet your aftercare needs.  If you have questions, or wish to unsubscribe from this service please call [phone]. Thank you for Choosing our [address] [place] [address] Emergency Department.

## 2021-04-22 NOTE — DISCHARGE INSTRUCTIONS
Apply ice  Take medication as prescribed  Avoid heavy lifting  Call primary care doctor for follow-up  Return to ER for any worsening symptoms or new problems which may arise
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
## 2021-09-27 ENCOUNTER — HOSPITAL ENCOUNTER (EMERGENCY)
Age: 31
Discharge: HOME | End: 2021-09-27
Attending: EMERGENCY MEDICINE
Payer: COMMERCIAL

## 2021-09-27 VITALS
SYSTOLIC BLOOD PRESSURE: 163 MMHG | DIASTOLIC BLOOD PRESSURE: 93 MMHG | RESPIRATION RATE: 18 BRPM | HEART RATE: 80 BPM | HEIGHT: 69 IN | BODY MASS INDEX: 41.47 KG/M2 | OXYGEN SATURATION: 98 % | TEMPERATURE: 98.5 F | WEIGHT: 280 LBS

## 2021-09-27 DIAGNOSIS — L03.311 CELLULITIS OF ABDOMINAL WALL: Primary | ICD-10-CM

## 2021-09-27 DIAGNOSIS — L02.419 CELLULITIS AND ABSCESS OF LEG, EXCEPT FOOT: ICD-10-CM

## 2021-09-27 DIAGNOSIS — L03.119 CELLULITIS AND ABSCESS OF LEG, EXCEPT FOOT: ICD-10-CM

## 2021-09-27 PROCEDURE — 99282 EMERGENCY DEPT VISIT SF MDM: CPT

## 2021-09-27 RX ORDER — SULFAMETHOXAZOLE AND TRIMETHOPRIM 800; 160 MG/1; MG/1
1 TABLET ORAL 2 TIMES DAILY
Qty: 14 TABLET | Refills: 0 | Status: SHIPPED | OUTPATIENT
Start: 2021-09-27 | End: 2021-10-04

## 2021-09-27 NOTE — ED PROVIDER NOTES
Pt is a 37 yo M who presents with c/o concern for spider bite. He noticed lesion to the abdomen that was tender and swollen yesterday and today it drained a small amount of pus. He also noticed some redness to the skin surrounding the area. No fever/chills, no h/o diabetes. The history is provided by the patient. No past medical history on file. No past surgical history on file. No family history on file. Social History     Socioeconomic History    Marital status: SINGLE     Spouse name: Not on file    Number of children: Not on file    Years of education: Not on file    Highest education level: Not on file   Occupational History    Not on file   Tobacco Use    Smoking status: Never Smoker    Smokeless tobacco: Never Used   Substance and Sexual Activity    Alcohol use: No    Drug use: No    Sexual activity: Not on file   Other Topics Concern    Not on file   Social History Narrative    ** Merged History Encounter **          Social Determinants of Health     Financial Resource Strain:     Difficulty of Paying Living Expenses:    Food Insecurity:     Worried About Running Out of Food in the Last Year:     [address] in the Last Year:    Transportation Needs:     Lack of Transportation (Medical):  Lack of Transportation (Non-Medical):    Physical Activity:     Days of Exercise per Week:     Minutes of Exercise per Session:    Stress:     Feeling of Stress :    Social Connections:     Frequency of Communication with Friends and Family:     Frequency of Social Gatherings with Friends and Family:     Attends Yarsani Services:     Active Member of Clubs or Organizations:     Attends Club or Organization Meetings:     Marital Status:    Intimate Partner Violence:     Fear of Current or Ex-Partner:     Emotionally Abused:     Physically Abused:     Sexually Abused: ALLERGIES: Patient has no known allergies.     Review of Systems   Constitutional: Negative for chills and fever. HENT: Negative for sore throat. Respiratory: Negative for cough and shortness of breath. Cardiovascular: Negative for chest pain. Gastrointestinal: Negative for abdominal pain, nausea and vomiting. Musculoskeletal: Negative for back pain. Skin: Positive for rash. Spider bite to abdomen   Neurological: Negative for dizziness, weakness and numbness. Psychiatric/Behavioral: Negative for agitation and confusion. There were no vitals filed for this visit. Physical Exam  Vitals and nursing note reviewed. Constitutional:       General: He is not in acute distress. Appearance: He is obese. He is not ill-appearing or toxic-appearing. HENT:      Head: Normocephalic and atraumatic. Skin:     General: Skin is warm and dry. Neurological:      Mental Status: He is alert and oriented to person, place, and time. Psychiatric:         Mood and Affect: Mood normal.         Behavior: Behavior normal.         Thought Content: Thought content normal.         Judgment: Judgment normal.          MDM  Number of Diagnoses or Management Options  Cellulitis and abscess of leg, except foot  Cellulitis of abdominal wall  Diagnosis management comments: Will dc with bactrim, instructed to apply warm compresses and take motrin/tylenol for pain/swelling. Discussed reasons to return to the ER. All agreeable to plan.           Procedures

## 2021-09-27 NOTE — ED NOTES
I have reviewed discharge instructions with the patient. The patient verbalized understanding. Patient left ED via Discharge Method: ambulatory to Home with self    Opportunity for questions and clarification provided. Patient given 1 scripts. To continue your aftercare when you leave the hospital, you may receive an automated call from our care team to check in on how you are doing. This is a free service and part of our promise to provide the best care and service to meet your aftercare needs.  If you have questions, or wish to unsubscribe from this service please call [phone]. Thank you for Choosing our [facility] Emergency Department.

## 2021-09-27 NOTE — Clinical Note
[facility] EMERGENCY DEPT  [address] [address] [phone]    Work/School Note    Date: 9/27/2021    To Whom It May concern: Valeria Cardenas was seen and treated today in the emergency room by the following provider(s):  No providers found. Valeria Cardenas is excused from work/school on 09/27/21 and 09/28/21. He is medically clear to return to work/school on 9/29/2021.        Sincerely,          CRIS Kaur

## 2021-09-27 NOTE — DISCHARGE INSTRUCTIONS
Take full course of antibiotics, apply warm compresses to area affected. Take motrin or tylenol for pain. Return to the ER with any fever, spreading redness or worsening symptoms.

## 2021-12-25 ENCOUNTER — HOSPITAL ENCOUNTER (EMERGENCY)
Age: 31
Discharge: HOME | End: 2021-12-25
Attending: EMERGENCY MEDICINE
Payer: COMMERCIAL

## 2021-12-25 VITALS
RESPIRATION RATE: 18 BRPM | DIASTOLIC BLOOD PRESSURE: 94 MMHG | SYSTOLIC BLOOD PRESSURE: 147 MMHG | WEIGHT: 315 LBS | HEART RATE: 74 BPM | HEIGHT: 69 IN | TEMPERATURE: 98.3 F | BODY MASS INDEX: 46.65 KG/M2 | OXYGEN SATURATION: 100 %

## 2021-12-25 DIAGNOSIS — U07.1 COVID: Primary | ICD-10-CM

## 2021-12-25 LAB
COVID-19 RAPID TEST, COVR: DETECTED
FLUAV AG NPH QL IA: NEGATIVE
FLUBV AG NPH QL IA: NEGATIVE
SOURCE, COVRS: ABNORMAL
SPECIMEN SOURCE: NORMAL

## 2021-12-25 PROCEDURE — 87804 INFLUENZA ASSAY W/OPTIC: CPT

## 2021-12-25 PROCEDURE — 87635 SARS-COV-2 COVID-19 AMP PRB: CPT

## 2021-12-25 PROCEDURE — 99283 EMERGENCY DEPT VISIT LOW MDM: CPT

## 2021-12-25 RX ORDER — GUAIFENESIN AND PSEUDOEPHEDRINE HCL 1200; 120 MG/1; MG/1
1 TABLET, EXTENDED RELEASE ORAL 2 TIMES DAILY
Qty: 14 TABLET | Refills: 0 | Status: SHIPPED | OUTPATIENT
Start: 2021-12-25 | End: 2022-01-01

## 2021-12-25 RX ORDER — BENZONATATE 100 MG/1
200 CAPSULE ORAL
Qty: 30 CAPSULE | Refills: 0 | Status: SHIPPED | OUTPATIENT
Start: 2021-12-25 | End: 2021-12-30

## 2021-12-25 NOTE — ED NOTES
I have reviewed discharge instructions with the patient. The patient verbalized understanding. Patient left ED via Discharge Method: ambulatory to Home with (ambulatory). Opportunity for questions and clarification provided. Patient given 2 scripts. To continue your aftercare when you leave the hospital, you may receive an automated call from our care team to check in on how you are doing. This is a free service and part of our promise to provide the best care and service to meet your aftercare needs.  If you have questions, or wish to unsubscribe from this service please call [phone]. Thank you for Choosing our [facility] Emergency Department.

## 2021-12-25 NOTE — Clinical Note
[id number] [address] EMERGENCY DEPT  [address] [address] [phone]    Work/School Note    Date: 12/25/2021     To Whom It May concern: Viktoriya Marie was evaluated by the following provider(s):  Attending Provider: Sybil Ocasio MD  Physician Assistant: Vera Zane virus is suspected. Per the CDC guidelines we recommend home isolation until the following conditions are all met:    1. At least 10 days have passed since symptoms first appeared and  2. At least 24 hours have passed since last fever without the use of fever-reducing medications and  3.  Symptoms (e.g., cough, shortness of breath) have improved      Sincerely,          CRIS Ulrich

## 2021-12-25 NOTE — ED PROVIDER NOTES
Patient presents to the emergency department stating yesterday he developed sore throat, headache, cough, body aches and some nasal congestion. He has had 2 loose stools today. He took some BC Goody powder about 2 hours prior to arrival.  No known sick contacts. He denies any chest pain or shortness of breath. No past medical history on file. No past surgical history on file. No family history on file. Social History     Socioeconomic History    Marital status: SINGLE     Spouse name: Not on file    Number of children: Not on file    Years of education: Not on file    Highest education level: Not on file   Occupational History    Not on file   Tobacco Use    Smoking status: Never Smoker    Smokeless tobacco: Never Used   Substance and Sexual Activity    Alcohol use: No    Drug use: No    Sexual activity: Not on file   Other Topics Concern    Not on file   Social History Narrative    ** Merged History Encounter **          Social Determinants of Health     Financial Resource Strain:     Difficulty of Paying Living Expenses: Not on file   Food Insecurity:     Worried About Running Out of Food in the Last Year: Not on file    Angel of Food in the Last Year: Not on file   Transportation Needs:     Lack of Transportation (Medical): Not on file    Lack of Transportation (Non-Medical):  Not on file   Physical Activity:     Days of Exercise per Week: Not on file    Minutes of Exercise per Session: Not on file   Stress:     Feeling of Stress : Not on file   Social Connections:     Frequency of Communication with Friends and Family: Not on file    Frequency of Social Gatherings with Friends and Family: Not on file    Attends Episcopalian Services: Not on file    Active Member of Clubs or Organizations: Not on file    Attends Club or Organization Meetings: Not on file    Marital Status: Not on file   Intimate Partner Violence:     Fear of Current or Ex-Partner: Not on file   [facility] Emotionally Abused: Not on file    Physically Abused: Not on file    Sexually Abused: Not on file   Housing Stability:     Unable to Pay for Housing in the Last Year: Not on file    Number of Places Lived in the Last Year: Not on file    Unstable Housing in the Last Year: Not on file         ALLERGIES: Patient has no known allergies. Review of Systems   Constitutional: Positive for chills and fever. HENT: Positive for congestion and sore throat. Respiratory: Positive for cough. Musculoskeletal: Positive for myalgias. Neurological: Positive for headaches. All other systems reviewed and are negative. Vitals:    12/25/21 1218 12/25/21 1440   BP: (!) 151/100 (!) 147/94   Pulse: 80 74   Resp: 18 18   Temp: 99.5 °F (37.5 °C) 98.3 °F (36.8 °C)   SpO2: 100% 100%   Weight: 145.2 kg (320 lb)    Height: 5' 9\" (1.753 m)             Physical Exam  Vitals and nursing note reviewed. Constitutional:       Appearance: Normal appearance. HENT:      Head: Normocephalic and atraumatic. Right Ear: Tympanic membrane normal.      Left Ear: Tympanic membrane normal.      Nose: Congestion present. Mouth/Throat:      Mouth: Mucous membranes are moist.      Comments: Normal posterior oropharynx. Eyes:      Extraocular Movements: Extraocular movements intact. Pupils: Pupils are equal, round, and reactive to light. Cardiovascular:      Rate and Rhythm: Normal rate and regular rhythm. Pulses: Normal pulses. Heart sounds: Normal heart sounds. Pulmonary:      Effort: Pulmonary effort is normal.      Breath sounds: Normal breath sounds. Abdominal:      General: Abdomen is flat. Palpations: Abdomen is soft. Tenderness: There is no abdominal tenderness. There is no guarding. Musculoskeletal:         General: Normal range of motion. Cervical back: Normal range of motion and neck supple. No rigidity. Skin:     General: Skin is warm and dry.       Capillary Refill: Capillary refill takes less than 2 seconds. Neurological:      General: No focal deficit present. Mental Status: He is alert and oriented to person, place, and time. Psychiatric:         Mood and Affect: Mood normal.         Behavior: Behavior normal.         Thought Content: Thought content normal.          MDM  Number of Diagnoses or Management Options  COVID  Diagnosis management comments: Differential diagnoses: COVID-19 infection, influenza, viral URI    Patient is positive for COVID-19. He does meet criteria for Regeneron based on his BMI. I did offer him this infusion but he declines and is wanting to go home.        Amount and/or Complexity of Data Reviewed  Clinical lab tests: reviewed    Risk of Complications, Morbidity, and/or Mortality  Presenting problems: moderate  Diagnostic procedures: moderate  Management options: moderate    Patient Progress  Patient progress: improved         Procedures

## 2021-12-25 NOTE — ED TRIAGE NOTES
Pt states yesterday he had a sore throat and today has generalized weakness, productive cough, chills, and headache. Pt states he has taken a BC powder roughly at 10 am. Masked.

## 2021-12-30 ENCOUNTER — APPOINTMENT (OUTPATIENT)
Dept: GENERAL RADIOLOGY | Age: 31
End: 2021-12-30
Attending: EMERGENCY MEDICINE
Payer: COMMERCIAL

## 2021-12-30 ENCOUNTER — HOSPITAL ENCOUNTER (EMERGENCY)
Age: 31
Discharge: HOME | End: 2021-12-30
Attending: EMERGENCY MEDICINE
Payer: COMMERCIAL

## 2021-12-30 VITALS
DIASTOLIC BLOOD PRESSURE: 74 MMHG | HEIGHT: 69 IN | HEART RATE: 84 BPM | SYSTOLIC BLOOD PRESSURE: 138 MMHG | RESPIRATION RATE: 16 BRPM | OXYGEN SATURATION: 98 % | BODY MASS INDEX: 44.43 KG/M2 | WEIGHT: 300 LBS | TEMPERATURE: 98.9 F

## 2021-12-30 DIAGNOSIS — U07.1 COVID-19: Primary | ICD-10-CM

## 2021-12-30 PROCEDURE — 71046 X-RAY EXAM CHEST 2 VIEWS: CPT

## 2021-12-30 PROCEDURE — 99283 EMERGENCY DEPT VISIT LOW MDM: CPT

## 2021-12-30 RX ORDER — ALBUTEROL SULFATE 90 UG/1
2 AEROSOL, METERED RESPIRATORY (INHALATION)
Qty: 18 G | Refills: 0 | Status: SHIPPED | OUTPATIENT
Start: 2021-12-30 | End: 2022-01-04

## 2021-12-30 NOTE — ED TRIAGE NOTES
Pt states he started with cough, runny nose, chills and body aches christmas eve. Pt states he was seen here [facility] and diagnosed with covid. Pt  Reports continued symptoms. Pt wants chest x ray.      Nate Sauceda RN

## 2021-12-30 NOTE — ED PROVIDER NOTES
82-year-old -American male presents today for evaluation of persistent cough and mild shortness of breath secondary to COVID-19 diagnosis. Patient symptoms began on 12/24, and he tested positive on 12/25 in this department. At that time Regeneron was recommended however patient declined this treatment. He states that at home he has been dealing with intermittent fever, chills, body aches. He states he continues to have a persistently productive cough states that due to the force of his coughing he has noticed small specks of blood in his sputum. He reports exertional dyspnea but denies any shortness of breath at rest.  No chest pain or pleuritic pain. No nausea, vomiting, diarrhea. No recent hospitalizations. History reviewed. No pertinent past medical history. History reviewed. No pertinent surgical history. History reviewed. No pertinent family history. Social History     Socioeconomic History    Marital status: SINGLE     Spouse name: Not on file    Number of children: Not on file    Years of education: Not on file    Highest education level: Not on file   Occupational History    Not on file   Tobacco Use    Smoking status: Never Smoker    Smokeless tobacco: Never Used   Substance and Sexual Activity    Alcohol use: No    Drug use: No    Sexual activity: Not on file   Other Topics Concern    Not on file   Social History Narrative    ** Merged History Encounter **          Social Determinants of Health     Financial Resource Strain:     Difficulty of Paying Living Expenses: Not on file   Food Insecurity:     Worried About Running Out of Food in the Last Year: Not on file    Angel of Food in the Last Year: Not on file   Transportation Needs:     Lack of Transportation (Medical): Not on file    Lack of Transportation (Non-Medical):  Not on file   Physical Activity:     Days of Exercise per Week: Not on file    Minutes of Exercise per Session: Not on file Stress:     Feeling of Stress : Not on file   Social Connections:     Frequency of Communication with Friends and Family: Not on file    Frequency of Social Gatherings with Friends and Family: Not on file    Attends [employer] Services: Not on file    Active Member of Clubs or Organizations: Not on file    Attends Club or Organization Meetings: Not on file    Marital Status: Not on file   Intimate Partner Violence:     Fear of Current or Ex-Partner: Not on file    Emotionally Abused: Not on file    Physically Abused: Not on file    Sexually Abused: Not on file   Housing Stability:     Unable to Pay for Housing in the Last Year: Not on file    Number of [place] in the Last Year: Not on file    Unstable Housing in the Last Year: Not on file         ALLERGIES: Patient has no known allergies. Review of Systems   Constitutional: Positive for chills, fatigue and fever. HENT: Negative for ear pain, sinus pain and sore throat. Respiratory: Positive for cough and shortness of breath. Gastrointestinal: Negative for abdominal pain, diarrhea, nausea and vomiting. Musculoskeletal: Positive for myalgias. Negative for neck pain and neck stiffness. Skin: Negative for rash. Neurological: Negative for dizziness, light-headedness and headaches. Vitals:    12/30/21 0955   BP: (!) 142/83   Pulse: 85   Resp: 16   Temp: 98.9 °F (37.2 °C)   SpO2: 97%   Weight: 136.1 kg (300 lb)   Height: 5' 9\" (1.753 m)            Physical Exam  Vitals and nursing note reviewed. Constitutional:       General: He is not in acute distress. Appearance: He is not ill-appearing. Comments: Body mass index is 44.3 kg/m². HENT:      Head: Macrocephalic. No right periorbital erythema or left periorbital erythema. Right Ear: Tympanic membrane and ear canal normal. Tympanic membrane is not erythematous. Left Ear: Tympanic membrane and ear canal normal. Tympanic membrane is not erythematous. Mouth/Throat:      Lips: Pink. Mouth: Mucous membranes are moist.      Pharynx: Oropharynx is clear. Uvula midline. No pharyngeal swelling, oropharyngeal exudate or posterior oropharyngeal erythema. Cardiovascular:      Rate and Rhythm: Normal rate and regular rhythm. Heart sounds: Normal heart sounds, S1 normal and S2 normal. No murmur heard. No friction rub. No gallop. Pulmonary:      Effort: Pulmonary effort is normal.      Breath sounds: No wheezing, rhonchi or rales. Skin:     General: Skin is warm and dry. Capillary Refill: Capillary refill takes less than 2 seconds. Neurological:      Mental Status: He is alert and oriented to person, place, and time. Psychiatric:         Mood and Affect: Mood normal.         Speech: Speech normal.         Behavior: Behavior normal.         Thought Content: Thought content normal.          MDM  Number of Diagnoses or Management Options  COVID-19  Diagnosis management comments: Viral infection, croup (bronchiolitis), mononucleosis, COVID-19, Acute sinusitis, chronic sinusitis, OM, serous OM, otitis externa, Pharyngitis, Strep Throat, adenitis, uvulitis, rhinitis, postnasal drainage, nasal congestion, Bronchitis, pneumonia    Is a well-appearing 33-year-old male who is day 6 Covid positive. He presents today due to persistent nature of cough, subjective fever, chills, and body aches. Was diagnosed on 12/25 and at that time declined monoclonal antibody treatment. Chest x-ray was not performed at that time and therefore chest x-ray was performed today and is clear with no signs of pneumonia. His vital signs are stable and there is no evidence of increased work of breathing, physical exam grossly unremarkable. Given persistent nature of his symptoms I did recommend monoclonal antibody treatment and baseline labs in the department today however patient declines all treatment and would like to be discharged home.   Using shared medical decision make, we will treat symptomatically with Robitussin DM, and albuterol inhaler for shortness of breath. Patient was educated on signs and symptoms that require emergent reevaluation in the ER and he verbalizes understanding and agrees with this plan. Amount and/or Complexity of Data Reviewed  Tests in the radiology section of CPT®: ordered and reviewed    Patient Progress  Patient progress: stable    ED Course as of 12/30/21 1126   Thu Dec 30, 2021   1038 FINDINGS: The lungs are clear and the pulmonary vasculature is normal. No  pneumothorax or pleural effusion. The cardiomediastinal silhouette is within  normal limits. The osseous structures are unremarkable.        IMPRESSION  No radiographic evidence of acute cardiopulmonary disease.    [KE]   1038 Patient is agreeable to Regeneron therapy today. Order placed. Physical exam today is grossly normal, lungs clear to auscultation. [KE]   1101 Spoke with patient again and he is declining monoclonal antibody treatment again today. He would like to be discharged. There is no evidence of pneumonia on chest x-ray and vital signs are stable. We will treat symptomatically for his cough and he will follow-up if he develops any new or worsening symptoms.  [KE]      ED Course User Index  [KE] Angela Kong       Procedures

## 2021-12-30 NOTE — Clinical Note
[id number] [address] EMERGENCY DEPT  [address] [address]  [phone]    Work/School Note    Date: 12/30/2021     To Whom It May concern: Skye Cadena was evaluated by the following provider(s):  Attending Provider: Johanne Elkins MD  Physician Assistant: Alberto Mayer, [address] virus is suspected. Per the CDC guidelines we recommend home isolation until the following conditions are all met:    1. At least 10 days have passed since symptoms first appeared and  2. At least 24 hours have passed since last fever without the use of fever-reducing medications and  3.  Symptoms (e.g., cough, shortness of breath) have improved      Sincerely,          Angela Bernstein

## 2021-12-30 NOTE — DISCHARGE INSTRUCTIONS
Your chest x-ray did not show any pneumonia. Per most recently updated CDC guidelines, you should self quarantine for 5 days. On day 6 if you are asymptomatic you may wear a mask while being around others, however if you still have any COVID-19 symptoms you should continue to self quarantine for the full 10 days. Take cough syrup and inhaler as needed for cough and wheezing. You may alternate Motrin and Tylenol for fever control. Return to the ER if you develop any new or worsening symptoms.

## 2021-12-30 NOTE — ED NOTES
I have reviewed discharge instructions with the patient. The patient verbalized understanding. Patient left ED via Discharge Method: ambulatory to Home with self. Opportunity for questions and clarification provided. Patient given 2 scripts. To continue your aftercare when you leave the hospital, you may receive an automated call from our care team to check in on how you are doing. This is a free service and part of our promise to provide the best care and service to meet your aftercare needs.  If you have questions, or wish to unsubscribe from this service please call [phone]. Thank you for Choosing our [facility] Emergency Department.

## 2021-12-30 NOTE — Clinical Note
[address] [address] EMERGENCY DEPT  [address] [address]  [phone]    Work/School Note    Date: 12/30/2021     To Whom It May concern: Flint Cranker was evaluated by the following provider(s):  Attending Provider: Kin Brian MD  Physician Assistant: Marisol Low, [address] virus is suspected. Per the CDC guidelines we recommend home isolation until the following conditions are all met:    1. At least 10 days have passed since symptoms first appeared and  2. At least 24 hours have passed since last fever without the use of fever-reducing medications and  3.  Symptoms (e.g., cough, shortness of breath) have improved      Sincerely,          Marjorie Montesinos RN

## 2023-12-04 ENCOUNTER — HOSPITAL ENCOUNTER (EMERGENCY)
Age: 33
Discharge: HOME | End: 2023-12-04
Attending: EMERGENCY MEDICINE

## 2023-12-04 VITALS
TEMPERATURE: 99.4 F | HEART RATE: 84 BPM | RESPIRATION RATE: 16 BRPM | DIASTOLIC BLOOD PRESSURE: 89 MMHG | WEIGHT: 300 LBS | BODY MASS INDEX: 44.43 KG/M2 | OXYGEN SATURATION: 96 % | SYSTOLIC BLOOD PRESSURE: 138 MMHG | HEIGHT: 69 IN

## 2023-12-04 DIAGNOSIS — B34.9 VIRAL ILLNESS: Primary | ICD-10-CM

## 2023-12-04 LAB
ANION GAP SERPL CALC-SCNC: 4 MMOL/L (ref 2–11)
BUN SERPL-MCNC: 14 MG/DL (ref 6–23)
CALCIUM SERPL-MCNC: 8.8 MG/DL (ref 8.3–10.4)
CHLORIDE SERPL-SCNC: 108 MMOL/L (ref 101–110)
CO2 SERPL-SCNC: 29 MMOL/L (ref 21–32)
CREAT SERPL-MCNC: 1.34 MG/DL (ref 0.8–1.5)
ERYTHROCYTE [DISTWIDTH] IN BLOOD BY AUTOMATED COUNT: 17.7 % (ref 11.9–14.6)
GLUCOSE SERPL-MCNC: 105 MG/DL (ref 65–100)
HCT VFR BLD AUTO: 47.9 % (ref 41.1–50.3)
HGB BLD-MCNC: 15.4 G/DL (ref 13.6–17.2)
MAGNESIUM SERPL-MCNC: 2 MG/DL (ref 1.8–2.4)
MCH RBC QN AUTO: 26.2 PG (ref 26.1–32.9)
MCHC RBC AUTO-ENTMCNC: 32.2 G/DL (ref 31.4–35)
MCV RBC AUTO: 81.6 FL (ref 82–102)
NRBC # BLD: 0 K/UL (ref 0–0.2)
PLATELET # BLD AUTO: 166 K/UL (ref 150–450)
PMV BLD AUTO: ABNORMAL FL (ref 9.4–12.3)
POTASSIUM SERPL-SCNC: 4.1 MMOL/L (ref 3.5–5.1)
RBC # BLD AUTO: 5.87 M/UL (ref 4.23–5.6)
SARS-COV-2 RDRP RESP QL NAA+PROBE: NOT DETECTED
SODIUM SERPL-SCNC: 141 MMOL/L (ref 133–143)
SOURCE: NORMAL
WBC # BLD AUTO: 4.8 K/UL (ref 4.3–11.1)

## 2023-12-04 PROCEDURE — 96374 THER/PROPH/DIAG INJ IV PUSH: CPT

## 2023-12-04 PROCEDURE — 2580000003 HC RX 258: Performed by: EMERGENCY MEDICINE

## 2023-12-04 PROCEDURE — 87635 SARS-COV-2 COVID-19 AMP PRB: CPT

## 2023-12-04 PROCEDURE — 96361 HYDRATE IV INFUSION ADD-ON: CPT

## 2023-12-04 PROCEDURE — 99284 EMERGENCY DEPT VISIT MOD MDM: CPT

## 2023-12-04 PROCEDURE — 80048 BASIC METABOLIC PNL TOTAL CA: CPT

## 2023-12-04 PROCEDURE — 85027 COMPLETE CBC AUTOMATED: CPT

## 2023-12-04 PROCEDURE — 6360000002 HC RX W HCPCS: Performed by: EMERGENCY MEDICINE

## 2023-12-04 PROCEDURE — 83735 ASSAY OF MAGNESIUM: CPT

## 2023-12-04 RX ORDER — BENZONATATE 100 MG/1
200 CAPSULE ORAL 3 TIMES DAILY PRN
Qty: 30 CAPSULE | Refills: 0 | Status: SHIPPED | OUTPATIENT
Start: 2023-12-04 | End: 2023-12-09

## 2023-12-04 RX ORDER — 0.9 % SODIUM CHLORIDE 0.9 %
1000 INTRAVENOUS SOLUTION INTRAVENOUS ONCE
Status: COMPLETED | OUTPATIENT
Start: 2023-12-04 | End: 2023-12-04

## 2023-12-04 RX ORDER — ONDANSETRON 2 MG/ML
4 INJECTION INTRAMUSCULAR; INTRAVENOUS ONCE
Status: COMPLETED | OUTPATIENT
Start: 2023-12-04 | End: 2023-12-04

## 2023-12-04 RX ADMIN — ONDANSETRON 4 MG: 2 INJECTION INTRAMUSCULAR; INTRAVENOUS at 11:01

## 2023-12-04 RX ADMIN — SODIUM CHLORIDE 1000 ML: 9 INJECTION, SOLUTION INTRAVENOUS at 08:46

## 2023-12-04 ASSESSMENT — PAIN DESCRIPTION - DESCRIPTORS: DESCRIPTORS: ACHING;DISCOMFORT

## 2023-12-04 ASSESSMENT — PAIN DESCRIPTION - FREQUENCY: FREQUENCY: CONTINUOUS

## 2023-12-04 ASSESSMENT — ENCOUNTER SYMPTOMS
VOMITING: 1
NAUSEA: 1
COUGH: 1
SHORTNESS OF BREATH: 1
ABDOMINAL PAIN: 0

## 2023-12-04 ASSESSMENT — LIFESTYLE VARIABLES
HOW MANY STANDARD DRINKS CONTAINING ALCOHOL DO YOU HAVE ON A TYPICAL DAY: PATIENT DOES NOT DRINK
HOW OFTEN DO YOU HAVE A DRINK CONTAINING ALCOHOL: NEVER

## 2023-12-04 ASSESSMENT — PAIN DESCRIPTION - LOCATION: LOCATION: HEAD

## 2023-12-04 ASSESSMENT — PAIN - FUNCTIONAL ASSESSMENT: PAIN_FUNCTIONAL_ASSESSMENT: 0-10

## 2023-12-04 ASSESSMENT — PAIN DESCRIPTION - PAIN TYPE: TYPE: ACUTE PAIN

## 2023-12-04 ASSESSMENT — PAIN SCALES - GENERAL: PAINLEVEL_OUTOF10: 9

## 2023-12-04 NOTE — DISCHARGE INSTRUCTIONS
Return with any fevers, vomiting, difficulty breathing, worsening symptoms, or additional concerns. Follow-up with your primary care doctor for reevaluation as needed. To help you get a primary care doctor for follow-up after your emergency department visit, please call [phone] between [address] [facility] Monday to Friday. A care navigator will be able to assist you with setting up a doctor close to your home.

## 2023-12-04 NOTE — ED TRIAGE NOTES
Pt reports worsened headache, sore throat, abdominal discomfort, fatigue, cough, emesis since Friday. Dyspnea w exertion.    (-)fever  A&Ox4

## 2023-12-04 NOTE — ED PROVIDER NOTES
82.0 - 102.0 FL    MCH 26.2 26.1 - 32.9 PG    MCHC 32.2 31.4 - 35.0 g/dL    RDW 17.7 (H) 11.9 - 14.6 %    Platelets 935 643 - 405 K/uL    MPV Unable to calculate. Recommend adding IPF. 9.4 - 12.3 FL    nRBC 0.00 0.0 - 0.2 K/uL   Basic Metabolic Panel   Result Value Ref Range    Sodium 141 133 - 143 mmol/L    Potassium 4.1 3.5 - 5.1 mmol/L    Chloride 108 101 - 110 mmol/L    CO2 29 21 - 32 mmol/L    Anion Gap 4 2 - 11 mmol/L    Glucose 105 (H) 65 - 100 mg/dL    BUN 14 6 - 23 MG/DL    Creatinine 1.34 0.8 - 1.5 MG/DL    Est, Glom Filt Rate >60 >60 ml/min/1.73m2    Calcium 8.8 8.3 - 10.4 MG/DL   Magnesium   Result Value Ref Range    Magnesium 2.0 1.8 - 2.4 mg/dL        No orders to display                     Voice dictation software was used during the making of this note. This software is not perfect and grammatical and other typographical errors may be present. This note has not been completely proofread for errors.         Maxi Shabazz MD  12/04/23 9211